# Patient Record
Sex: FEMALE | Race: WHITE | NOT HISPANIC OR LATINO | Employment: STUDENT
[De-identification: names, ages, dates, MRNs, and addresses within clinical notes are randomized per-mention and may not be internally consistent; named-entity substitution may affect disease eponyms.]

---
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
## 2021-07-15 ENCOUNTER — OFFICE VISIT (OUTPATIENT)
Dept: FAMILY MEDICINE | Facility: CLINIC | Age: 21
End: 2021-07-15
Payer: COMMERCIAL

## 2021-07-15 VITALS
SYSTOLIC BLOOD PRESSURE: 114 MMHG | WEIGHT: 101.4 LBS | HEIGHT: 63 IN | BODY MASS INDEX: 17.96 KG/M2 | OXYGEN SATURATION: 99 % | RESPIRATION RATE: 14 BRPM | DIASTOLIC BLOOD PRESSURE: 70 MMHG | TEMPERATURE: 96.5 F | HEART RATE: 58 BPM

## 2021-07-15 DIAGNOSIS — F39 MOOD DISORDER (H): ICD-10-CM

## 2021-07-15 DIAGNOSIS — K59.00 CONSTIPATION, UNSPECIFIED CONSTIPATION TYPE: ICD-10-CM

## 2021-07-15 DIAGNOSIS — Z11.3 ROUTINE SCREENING FOR STI (SEXUALLY TRANSMITTED INFECTION): ICD-10-CM

## 2021-07-15 DIAGNOSIS — Z00.00 ROUTINE GENERAL MEDICAL EXAMINATION AT A HEALTH CARE FACILITY: Primary | ICD-10-CM

## 2021-07-15 DIAGNOSIS — R19.8 IRREGULAR BOWEL HABITS: ICD-10-CM

## 2021-07-15 PROCEDURE — 87591 N.GONORRHOEAE DNA AMP PROB: CPT | Performed by: FAMILY MEDICINE

## 2021-07-15 PROCEDURE — 86803 HEPATITIS C AB TEST: CPT | Performed by: FAMILY MEDICINE

## 2021-07-15 PROCEDURE — 36415 COLL VENOUS BLD VENIPUNCTURE: CPT | Performed by: FAMILY MEDICINE

## 2021-07-15 PROCEDURE — 87389 HIV-1 AG W/HIV-1&-2 AB AG IA: CPT | Performed by: FAMILY MEDICINE

## 2021-07-15 PROCEDURE — 99213 OFFICE O/P EST LOW 20 MIN: CPT | Mod: 25 | Performed by: FAMILY MEDICINE

## 2021-07-15 PROCEDURE — 87491 CHLMYD TRACH DNA AMP PROBE: CPT | Performed by: FAMILY MEDICINE

## 2021-07-15 PROCEDURE — 99385 PREV VISIT NEW AGE 18-39: CPT | Performed by: FAMILY MEDICINE

## 2021-07-15 ASSESSMENT — ANXIETY QUESTIONNAIRES
IF YOU CHECKED OFF ANY PROBLEMS ON THIS QUESTIONNAIRE, HOW DIFFICULT HAVE THESE PROBLEMS MADE IT FOR YOU TO DO YOUR WORK, TAKE CARE OF THINGS AT HOME, OR GET ALONG WITH OTHER PEOPLE: VERY DIFFICULT
7. FEELING AFRAID AS IF SOMETHING AWFUL MIGHT HAPPEN: NOT AT ALL
3. WORRYING TOO MUCH ABOUT DIFFERENT THINGS: NEARLY EVERY DAY
6. BECOMING EASILY ANNOYED OR IRRITABLE: SEVERAL DAYS
2. NOT BEING ABLE TO STOP OR CONTROL WORRYING: SEVERAL DAYS
GAD7 TOTAL SCORE: 8
1. FEELING NERVOUS, ANXIOUS, OR ON EDGE: NOT AT ALL
5. BEING SO RESTLESS THAT IT IS HARD TO SIT STILL: NOT AT ALL

## 2021-07-15 ASSESSMENT — PATIENT HEALTH QUESTIONNAIRE - PHQ9
SUM OF ALL RESPONSES TO PHQ QUESTIONS 1-9: 14
5. POOR APPETITE OR OVEREATING: NEARLY EVERY DAY

## 2021-07-15 ASSESSMENT — MIFFLIN-ST. JEOR: SCORE: 1196.14
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
## 2021-07-15 NOTE — PATIENT INSTRUCTIONS
You will be contacted in 1-2 days for results of your lab tests.    Start metamucil as prescribed.  Eat food rich in fiber but avoid food that triggers symptoms.  Schedule virtual visit in 2-3 weeks for follow up on the bowel habits.    If you need to address the mood disorder more, schedule future virtual visit as well.    Preventative Care Visits include: Yearly physicals, Well-child visits, Welcome to Medicare visits, & Medicare yearly wellness exams.    The purpose of these visits is to discuss your medical history and prevent health problems before you are sick.  You may need to pay a copay, coinsurance or deductible if your visit today includes testing or treating for a new or existing condition.    Additional charges may be incurred for today's visit. If you have questions about what your insurance plan covers, please contact your Insurance Company's member service department.  If you have questions specific to a bill you have already received from [employer], please contact the [email or web address] Billing Office at [phone].     Preventive Health Recommendations  Female Ages 18 to 20     Yearly exam:     See your health care provider every year in order to  o Review health changes.   o Discuss preventive care.    o Review your medicines if your doctor has prescribed any.      You should be tested each year for STDs (sexually transmitted diseases).       After age 20, talk to your provider about how often you should have cholesterol testing.      If you are at risk for diabetes, you should have a diabetes test (fasting glucose).     Shots:     Get a flu shot each year.     Get a tetanus shot every 10 years.     Consider getting the shot (vaccine) that prevents cervical cancer (Gardasil).    Nutrition:     Eat at least 5 servings of fruits and vegetables each day.    Eat whole-grain bread, whole-wheat pasta and brown rice instead of white grains and rice.    Get adequate Calcium and Vitamin D.      Lifestyle    Exercise at least 150 minutes a week each week (30 minutes a day, 5 days a week). This will help you control your weight and prevent disease.    No smoking.     Wear sunscreen to prevent skin cancer.    See your dentist every six months for an exam and cleaning.

## 2021-07-16 LAB
C TRACH DNA SPEC QL PROBE+SIG AMP: NEGATIVE
HCV AB SERPL QL IA: NONREACTIVE
HIV 1+2 AB+HIV1 P24 AG SERPL QL IA: NONREACTIVE
N GONORRHOEA DNA SPEC QL NAA+PROBE: NEGATIVE

## 2021-07-16 ASSESSMENT — ANXIETY QUESTIONNAIRES: GAD7 TOTAL SCORE: 8

## 2021-09-19 ENCOUNTER — HEALTH MAINTENANCE LETTER (OUTPATIENT)
Age: 21
End: 2021-09-19

## 2021-10-13 ENCOUNTER — TELEPHONE (OUTPATIENT)
Dept: FAMILY MEDICINE | Facility: CLINIC | Age: 21
End: 2021-10-13

## 2021-10-14 ENCOUNTER — OFFICE VISIT (OUTPATIENT)
Dept: FAMILY MEDICINE | Facility: CLINIC | Age: 21
End: 2021-10-14
Payer: COMMERCIAL

## 2021-10-14 VITALS
HEART RATE: 61 BPM | OXYGEN SATURATION: 98 % | SYSTOLIC BLOOD PRESSURE: 100 MMHG | WEIGHT: 103 LBS | BODY MASS INDEX: 18.25 KG/M2 | DIASTOLIC BLOOD PRESSURE: 62 MMHG | HEIGHT: 63 IN | TEMPERATURE: 98.6 F | RESPIRATION RATE: 16 BRPM

## 2021-10-14 DIAGNOSIS — R05.9 COUGH: ICD-10-CM

## 2021-10-14 DIAGNOSIS — J02.0 STREP THROAT: Primary | ICD-10-CM

## 2021-10-14 PROCEDURE — U0003 INFECTIOUS AGENT DETECTION BY NUCLEIC ACID (DNA OR RNA); SEVERE ACUTE RESPIRATORY SYNDROME CORONAVIRUS 2 (SARS-COV-2) (CORONAVIRUS DISEASE [COVID-19]), AMPLIFIED PROBE TECHNIQUE, MAKING USE OF HIGH THROUGHPUT TECHNOLOGIES AS DESCRIBED BY CMS-2020-01-R: HCPCS | Performed by: FAMILY MEDICINE

## 2021-10-14 PROCEDURE — U0005 INFEC AGEN DETEC AMPLI PROBE: HCPCS | Performed by: FAMILY MEDICINE

## 2021-10-14 PROCEDURE — 99213 OFFICE O/P EST LOW 20 MIN: CPT | Performed by: FAMILY MEDICINE

## 2021-10-14 RX ORDER — BENZONATATE 100 MG/1
100 CAPSULE ORAL 3 TIMES DAILY PRN
Qty: 20 CAPSULE | Refills: 0 | Status: SHIPPED | OUTPATIENT
Start: 2021-10-14 | End: 2021-12-24

## 2021-10-14 RX ORDER — AZITHROMYCIN 250 MG/1
TABLET, FILM COATED ORAL
Qty: 6 TABLET | Refills: 0 | Status: SHIPPED | OUTPATIENT
Start: 2021-10-14 | End: 2021-10-19

## 2021-10-14 RX ORDER — ECHINACEA PURPUREA EXTRACT 125 MG
2 TABLET ORAL 3 TIMES DAILY PRN
Qty: 15 ML | Refills: 1 | Status: SHIPPED | OUTPATIENT
Start: 2021-10-14 | End: 2021-12-24

## 2021-10-14 ASSESSMENT — MIFFLIN-ST. JEOR: SCORE: 1198.71

## 2021-10-14 NOTE — PROGRESS NOTES
Assessment & Plan     Strep throat  Cough  Treat presumptively for for strep but with a different antibiotic given she had penicillin twice within the last month. I will give her azithromycin which also could have coverage for any pulmonary etiology like walking pneumonia but her lung exam was normal.  We will also test for Covid just to make sure she is cleared to be able to go back to school.  Discuss precautions of handwashing and not sharing dishes with her roommates to prevent spreading strep.  Gave her prescription for nasal saline as well as recommended using steam with caution to help clear the  nasal congestion  - azithromycin (ZITHROMAX) 250 MG tablet  Dispense: 6 tablet; Refill: 0  - benzonatate (TESSALON) 100 MG capsule  Dispense: 20 capsule; Refill: 0  - Symptomatic COVID-19 Virus (Coronavirus) by PCR Nose    Call or return to clinic if not improving or symptoms worsening, fever or unable to adequate oral intake.       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 She is also had a runny nose and sore throat off and on for the last few weeks.  She reports going to a [facility] clinic in late September and tested positive for strep and negative for Covid. She was treated with penicillin. She felt better for about a week and then the symptoms came back. She was seen at a different [facility] clinic 10 days ago tested positive again for strep and negative for Covid. She was given penicillin again. She did not feel that the second round of antibiotics was helpful.  She reports a dry cough is worse at night.  She is also  "having a mind fog and fatigue. She has had some earaches that have improved slightly.   sick contact -roommates have similar symptoms with URI type symptoms but have not been seen by a doctor. She is also had a lot of people at her school who have been sick.  Tried-water and cough drops    The records and treatment from the [facility] clinic are not in [facility] so she signed an RACHELLE for both locations    Social history  She is a college student at the [employer] studying [employer]    Review of Systems     Please see above.  The rest of the review of systems are negative for all systems.    Current Outpatient Medications   Medication Instructions     azithromycin (ZITHROMAX) 250 MG tablet Take 2 tablets (500 mg) by mouth daily for 1 day, THEN 1 tablet (250 mg) daily for 4 days.     benzonatate (TESSALON) 100 mg, Oral, 3 TIMES DAILY PRN     chlorophyll 100 mg, Oral, DAILY     sodium chloride (OCEAN) 0.65 % nasal spray 2 sprays, Nasal, 3 TIMES DAILY PRN         Objective   Vitals:    10/14/21 1630   BP: 100/62   Pulse: 61   Resp: 16   Temp: 98.6  F (37  C)   TempSrc: Oral   SpO2: 98%   Weight: 46.7 kg (103 lb)   Height: 1.588 m (5' 2.52\")       Body mass index is 18.53 kg/m .    Physical Exam    OBJECTIVE:  Vitals listed above within normal limits  General appearance: well groomed, pleasant, well hydrated, nontoxic appearing  ENT: PERRL, throat erythematous with mild cobblestoning of posterior pharnyx, TMs normal   Neck: neck supple, no lymphadenopathy, no thyromegaly  Lungs: lungs clear to auscultation bilaterally, no wheezes or rhonchi  Heart: regular rate and rhythm, no murmurs, rubs or gallops  Abdomen: soft, nontender  Neuro: no focal deficits, CN II-XII grossly intact, alert and oriented  Psych:  mood stable, appears to have good insight and judgment    No results found for this or any previous visit (from the past 1008 hour(s)).           "

## 2021-10-15 LAB — SARS-COV-2 RNA RESP QL NAA+PROBE: NEGATIVE

## 2021-12-23 NOTE — PROGRESS NOTES
Tahmina is a 21 year old who is being evaluated via a billable video visit.      How would you like to obtain your AVS? MyChart  If the video visit is dropped, the invitation should be resent by: Text to cell phone: [phone]  Will anyone else be joining your video visit? No    Video Start Time: 8:00 AM    Assessment & Plan     Organic affective disorder  Patient has occasional racing thoughts. Racing speech. Overeating.  Patient also has features of OCD with intrusive thoughts and previous history of compulsions.    JAMA (generalized anxiety disorder)  Issues of generalized anxiety have also been associated with depression and diminished motivation.  These episodes are interspersed numbness.  Some hyperactivity, and racing thoughts.    Patient has significant intrusive thoughts of negativity, rarely suicidality, and sexuality discord thoughts.  It is at the level of intrusive that she is most hindered.       Depression Screening Follow Up    PHQ 12/24/2021   PHQ-9 Total Score 15   Q9: Thoughts of better off dead/self-harm past 2 weeks Not at all         Follow Up Actions Taken       See Patient Instructions    No follow-ups on file.    Mina Marks MD  [facility]    Rebeka Martel is a 21 year old who presents for the following health issues     HPI 21-year-old female who has had significant issues from a psychological standpoint for years duration.  She has a history dating back several years regarding OCD features of having to count  Prior to activity completion.    She states that when these compulsions disappeared, she started to have intrusive thoughts.  Her intrusive thoughts are of negativity about self, as well as about her sexuality.  Patient is homosexual.  She states her intrusive thoughts of sexually telling her to be heterosexual.    She has difficulty in school at the level of focus.  Occasionally she has very low motivation to do things.  She procrastinates.  She also has  "issues of anxiety occasionally riddled with panic.  She has been overeating of late.  She states that eating opacifies her symptoms to some degree.    She has a family history of generalized anxiety disorder.  There is no family history of bipolar affective disorder that she is aware of.  She denies grandiosity.  She denies overspending but does have strong urges to spend particularly when she feels hyper energetic.  She states that her family members noticed her speech pattern when she is \"hyper\".    She has been seeing a psychologist for roughly 1 year.  She has not started on any medications.  She would like to have screening for mental illness and eventual treatment.  Patient states that she would like to work through her issues naturally if she can.    Anxiety      Review of Systems   Constitutional, HEENT, cardiovascular, pulmonary, gi and gu systems are negative, except as otherwise noted.      Objective           Vitals:  No vitals were obtained today due to virtual visit.    Physical Exam   GENERAL: Healthy, alert and no distress  EYES: Eyes grossly normal to inspection.  No discharge or erythema, or obvious scleral/conjunctival abnormalities.  RESP: No audible wheeze, cough, or visible cyanosis.  No visible retractions or increased work of breathing.    SKIN: Visible skin clear. No significant rash, abnormal pigmentation or lesions.  NEURO: Cranial nerves grossly intact.  Mentation and speech appropriate for age.  PSYCH: Mentation appears normal, affect normal/bright, judgement and insight intact, normal speech and appearance well-groomed.    Office Visit on 10/14/2021   Component Date Value Ref Range Status     SARS CoV2 PCR 10/14/2021 Negative  Negative Final    NEGATIVE: SARS-CoV-2 (COVID-19) RNA not detected, presumed negative.               Video-Visit Details    Type of service:  Video Visit    Video End Time:8:53 AM    Originating Location (pt. Location): Home    Distant Location (provider " location):  [facility]     Platform used for Video Visit: Unable to complete video visit

## 2021-12-24 ENCOUNTER — VIRTUAL VISIT (OUTPATIENT)
Dept: FAMILY MEDICINE | Facility: CLINIC | Age: 21
End: 2021-12-24
Payer: COMMERCIAL

## 2021-12-24 ENCOUNTER — E-CONSULT (OUTPATIENT)
Dept: BEHAVIORAL HEALTH | Facility: CLINIC | Age: 21
End: 2021-12-24

## 2021-12-24 DIAGNOSIS — F41.1 GAD (GENERALIZED ANXIETY DISORDER): ICD-10-CM

## 2021-12-24 DIAGNOSIS — F06.30 ORGANIC AFFECTIVE DISORDER: Primary | ICD-10-CM

## 2021-12-24 PROCEDURE — 99451 NTRPROF PH1/NTRNET/EHR 5/>: CPT | Performed by: PSYCHIATRY & NEUROLOGY

## 2021-12-24 PROCEDURE — 99213 OFFICE O/P EST LOW 20 MIN: CPT | Mod: 95 | Performed by: INTERNAL MEDICINE

## 2021-12-24 ASSESSMENT — ANXIETY QUESTIONNAIRES
2. NOT BEING ABLE TO STOP OR CONTROL WORRYING: NEARLY EVERY DAY
7. FEELING AFRAID AS IF SOMETHING AWFUL MIGHT HAPPEN: SEVERAL DAYS
3. WORRYING TOO MUCH ABOUT DIFFERENT THINGS: NEARLY EVERY DAY
6. BECOMING EASILY ANNOYED OR IRRITABLE: SEVERAL DAYS
5. BEING SO RESTLESS THAT IT IS HARD TO SIT STILL: MORE THAN HALF THE DAYS
IF YOU CHECKED OFF ANY PROBLEMS ON THIS QUESTIONNAIRE, HOW DIFFICULT HAVE THESE PROBLEMS MADE IT FOR YOU TO DO YOUR WORK, TAKE CARE OF THINGS AT HOME, OR GET ALONG WITH OTHER PEOPLE: SOMEWHAT DIFFICULT
GAD7 TOTAL SCORE: 14
1. FEELING NERVOUS, ANXIOUS, OR ON EDGE: SEVERAL DAYS

## 2021-12-24 ASSESSMENT — PATIENT HEALTH QUESTIONNAIRE - PHQ9
5. POOR APPETITE OR OVEREATING: NEARLY EVERY DAY
SUM OF ALL RESPONSES TO PHQ QUESTIONS 1-9: 15

## 2021-12-24 NOTE — PROGRESS NOTES
ALL SMARTFIELDS MUST BE COMPLETED FOR PATIENT CARE AND BILLING    12/24/2021     E-Consult has been accepted.    Interprofessional consultation requested by:  Mina Marks MD      Clinical Question/Purpose: MY CLINICAL QUESTION IS: Patient with affective symptoms, OCD symptomatology, occasional intrusive thoughts negativity, sexuality, suicidality    Patient assessment and information reviewed: chart review    Recommendations:  I am glad she is going to therapy, but there are a few specific things that may be of help for her.  At a minimum, she should be working on CBT skills to help with intrusive thoughts.  As far as some of her other symptoms, these may be helped with some DBT skills.  It may be worth sending a note or having the patient talk to her therapist about these two specific kinds of therapy to make sure that some of these skills are learned.     As far as medications go, starting with an selective serotonin reuptake inhibitor would be in order.  This is both good for anxiety and OCD like symptoms.  Prozac is the most widely used for OCD like symptoms.  Starting at 10 mg for a week and then going to 20 mg would be a good way to start.   To treat the OCD like symptoms, most people need to go to a high dose of prozac (80 mg).  Prozac can be increased every 2-4 weeks, if she is tolerating it.  Symptoms may improve at a lower dose, which would be great.  There is no reason to go to a higher dose unless breakthrough symptoms come on or only a partial improvement.  It is best to go to the highest dose before looking to add medications.  Prozac can cause worsening anxiety (but also can be an excellent medication for anxiety).  Usually instead of worsening anxiety, a person might feel jittery, not be able to sleep as well or feel like they have too much energy (like drinking too much caffeine).  If this happens, prozac should be stopped.    Another option would be lexapro. Lexapro works as well as prozac but  generally does not have the increased energy or jitteriness that may occur with prozac.  One can start with 5 mg for a week and then go to 10 mg.  For OCD like symptoms, usually you have to go to the highest dose of 20 mg.  Similar to prozac, if symptoms are improved, there is no reason to go up.      Hydroxyzine is a great way to help with the off and on anxiety and sometimes feeling like one is hyper.  Using 25-50 mg tid prn for anxiety can be helpful.  The main side effect is sleepiness, which can be used as a bonus if helping sleep is needed.  Some need to take 12.5 mg to avoid sleepiness, which is okay.      Thank you for the consult.      The recommendations provided in this E-Consult are based on the clinical data available to me in the medical record, and are furnished without the benefit of a comprehensive in-person or virtual patient evaluation.  This consultation should not replace the clinical judgement and evaluation of the provider ordering this E-Consult. Any new clinical issues, or changes in patient status since the filing of this E-Consult will need to be taken into account when assessing these recommendations. Please contact me if you have further questions.    My total time spent reviewing clinical information and formulating assessment was 15 minutes.    Report sent automatically to requesting provider once signed.     Sami García MD

## 2021-12-25 ASSESSMENT — ANXIETY QUESTIONNAIRES: GAD7 TOTAL SCORE: 14

## 2022-04-08 ENCOUNTER — OFFICE VISIT (OUTPATIENT)
Dept: FAMILY MEDICINE | Facility: CLINIC | Age: 22
End: 2022-04-08
Payer: COMMERCIAL

## 2022-04-08 ENCOUNTER — LAB (OUTPATIENT)
Dept: LAB | Facility: CLINIC | Age: 22
End: 2022-04-08
Payer: COMMERCIAL

## 2022-04-08 ENCOUNTER — TELEPHONE (OUTPATIENT)
Dept: FAMILY MEDICINE | Facility: CLINIC | Age: 22
End: 2022-04-08

## 2022-04-08 VITALS
OXYGEN SATURATION: 98 % | DIASTOLIC BLOOD PRESSURE: 88 MMHG | TEMPERATURE: 97.5 F | HEART RATE: 66 BPM | SYSTOLIC BLOOD PRESSURE: 131 MMHG | WEIGHT: 105.4 LBS | HEIGHT: 63 IN | BODY MASS INDEX: 18.68 KG/M2

## 2022-04-08 DIAGNOSIS — Z72.51 UNPROTECTED SEXUAL INTERCOURSE: ICD-10-CM

## 2022-04-08 DIAGNOSIS — Z72.51 UNPROTECTED SEXUAL INTERCOURSE: Primary | ICD-10-CM

## 2022-04-08 LAB
HSV1 IGG SERPL QL IA: <0.01 INDEX
HSV1 IGG SERPL QL IA: NORMAL
HSV2 IGG SERPL QL IA: 0.06 INDEX
HSV2 IGG SERPL QL IA: NORMAL
T PALLIDUM AB SER QL: NONREACTIVE

## 2022-04-08 PROCEDURE — 86780 TREPONEMA PALLIDUM: CPT | Mod: 90 | Performed by: PATHOLOGY

## 2022-04-08 PROCEDURE — 99000 SPECIMEN HANDLING OFFICE-LAB: CPT | Performed by: PATHOLOGY

## 2022-04-08 PROCEDURE — 86695 HERPES SIMPLEX TYPE 1 TEST: CPT | Mod: 90 | Performed by: PATHOLOGY

## 2022-04-08 PROCEDURE — 99203 OFFICE O/P NEW LOW 30 MIN: CPT | Performed by: NURSE PRACTITIONER

## 2022-04-08 PROCEDURE — 36415 COLL VENOUS BLD VENIPUNCTURE: CPT | Performed by: PATHOLOGY

## 2022-04-08 PROCEDURE — 86696 HERPES SIMPLEX TYPE 2 TEST: CPT | Mod: 90 | Performed by: PATHOLOGY

## 2022-04-08 ASSESSMENT — ENCOUNTER SYMPTOMS
WOUND: 0
FATIGUE: 0
DIARRHEA: 0
HEMATURIA: 0
DYSURIA: 0
ADENOPATHY: 0
NAUSEA: 0
SHORTNESS OF BREATH: 0
COUGH: 0
ABDOMINAL PAIN: 0
CHILLS: 0
VOMITING: 0
BRUISES/BLEEDS EASILY: 0
FEVER: 0
DIFFICULTY URINATING: 0

## 2022-04-08 NOTE — TELEPHONE ENCOUNTER
Lab called said that they have an incorrect lab swab for the patient Tahmina and other concerns about the lab and would like to talk to you about the labs.   Here is the phone number, [phone].

## 2022-04-08 NOTE — PATIENT INSTRUCTIONS
Patient Education     Yeast Infection (Candida Vaginal Infection)    You have a Candida vaginal infection. This is also known as a yeast infection. It's most often caused by a type of yeast (fungus) called Candida. Candida are normally found in the vagina. But if they increase in number, this can lead to infection and cause symptoms.   Symptoms of a yeast infection can include:     Clumpy or thin, white discharge, which may look like cottage cheese    Itching or burning    Burning with urination  Certain factors can make a yeast infection more likely. These can include:     Taking certain medicines, such as antibiotics or birth control pills    Pregnancy    Diabetes    Weak immune system  A yeast infection is most often treated with antifungal medicine. This may be given as a vaginal cream or pills you take by mouth. Treatment may last for about 1 to 7 days. Women with severe or recurrent infections may need longer courses of treatment.   Home care    If you re prescribed medicine, be sure to use it as directed. Finish all of the medicine, even if your symptoms go away. Don t try to treat yourself using over-the-counter products without talking with your provider first. They will let you know if this is a good option for you.    Ask your provider what steps you can take to help reduce your risk of having a yeast infection in the future.    Follow-up care  Follow up with your healthcare provider, or as directed.   When to seek medical advice  Call your healthcare provider right away if:     You have a fever of 100.4 F (38 C) or higher, or as directed by your provider.    Your symptoms worsen, or they don t go away within a few days of starting treatment.    You have new pain in the lower belly or pelvic region.    You have side effects that bother you or a reaction to the cream or pills you re prescribed.    You or any partners you have sex with have new symptoms, such as a rash, joint pain, or sores.  [employer] last  reviewed this educational content on 7/1/2020 2000-2021 [employer]. All rights reserved. This information is not intended as a substitute for professional medical care. Always follow your healthcare professional's instructions.             If You Think You Have an STI (STD)  Treating a sexually transmitted infection (STI) early limits the problems it can cause and helps prevent its spread to others. An STI is also known as a sexually transmitted disease (STD). If you have an STI, get treated right away. Ask your partner to be tested, too. Then avoid sex until you ve finished treatment and your healthcare provider says it s OK to have sex again.   Follow your treatment plan  Treatment depends on the type of STI you have. Common treatments include injections and oral pills or liquids. Creams and gels can be applied to sores or warts caused by certain STIs. Follow the tips below:     Get new treatment for each new STI.    Don t use old medicine, even for the same STI. Use medicines as directed.    Don t share medicine unless instructed to do so by your healthcare provider or clinic.  Talk to your partner  If you have an STI, it s your duty to tell all your recent partners so they can be tested and treated. This is one important way to prevent the disease from being spread. Telling a partner that you have an STI can be hard. You may be embarrassed, angry, or afraid. It s often unclear who had the STI first. So try not to place blame. Your healthcare provider may offer some advice on how to start.     Prevent future problems  Even after you ve been treated, you can still be infected again. This is a common problem. It can happen if a partner passes the STI back to you. To prevent this, your partner or partners must be tested. He or she may also need treatment. After treatment, go to any scheduled follow-up visits. Then prevent future problems with safer sex. Limit your number of partners and always use  a latex condom.   Diagnosing STIs  Your healthcare provider will take a health history and examine you. During your health history, you will be asked about your sex habits and health history. You may also be asked about drug use. Give honest answers. Your healthcare provider will then check your body for signs of STIs. He or she also may do one or more of the following tests:     Fluid may be swabbed from sores. Samples also may be taken from the vagina, penis, mouth, or rectum. The samples are then tested for STIs.    Blood or urine samples may be taken. They are checked for viruses or bacteria that cause STIs.    For women, cells from the cervix are checked for signs of cancer and the genital wart virus (human papillomavirus, or HPV). This is called a Pap smear, and is often now done along with HPV testing. If cell changes are found, or a high-risk type of HPV is found, a magnifying scope may be used to take a closer look (colposcopy). In men and women, a Pap smear may be done on the anus to check for HPV-linked cancer or precancerous changes. This is done by a healthcare provider gently swabbing cells from the lining of the anus, and then sending the sample to be looked at in the lab. If there are signs of abnormality, you may need more testing.  [employer] last reviewed this educational content on 2/1/2019 2000-2021 [employer]. All rights reserved. This information is not intended as a substitute for professional medical care. Always follow your healthcare professional's instructions.

## 2022-04-08 NOTE — PROGRESS NOTES
Today's Date: Apr 8, 2022     Patient Tahmina Muhammad 2000 presents to the clinic today to address   Chief Complaint   Patient presents with     Establish Care     sti testing symptomatic time 3-4 days             SUBJECTIVE     History of Present Illness: 21 year old female patient presents for STI testing. Patient reports an increase in vaginal discharge a few days ago, which coincides with sexual encounter with new partner. Patient denies fevers, lower pelvic pain. She endorses vaginal itching and  cottage cheese consistency of vaginal discharge. Denies foul smelling vaginal discharge odor and UTI symptoms. Reports recent start to menstrual cycle. No other acute concerns/complaints at time of exam.    Review of Systems   Constitutional: Negative for chills, fatigue and fever.   Respiratory: Negative for cough and shortness of breath.    Cardiovascular: Negative for chest pain.   Gastrointestinal: Negative for abdominal pain, diarrhea, nausea and vomiting.   Genitourinary: Positive for vaginal discharge. Negative for difficulty urinating, dyspareunia, dysuria, genital sores, hematuria and pelvic pain.   Skin: Negative for rash and wound.   Hematological: Negative for adenopathy. Does not bruise/bleed easily.         No Known Allergies     No current outpatient medications    History reviewed. No pertinent past medical history.     Family History   Problem Relation Age of Onset     Anemia Mother         Social History     Tobacco Use     Smoking status: Never Smoker     Smokeless tobacco: Never Used   Vaping Use     Vaping Use: Never used   Substance Use Topics     Alcohol use: Yes     Alcohol/week: 12.0 standard drinks     Types: 12 Standard drinks or equivalent per week     Drug use: Yes     Types: Marijuana        History   Sexual Activity     Sexual activity: Yes     Partners: Female        PHQ 7/15/2021 12/24/2021   PHQ-9 Total Score 14 15   Q9: Thoughts of better off dead/self-harm past 2 weeks Not at all  "Not at all        Immunization History   Administered Date(s) Administered     COVID-19,PF,Moderna 04/10/2021, 05/08/2021     DTaP, Unspecified 2001, 01/29/2002, 03/25/2002, 12/27/2002, 09/22/2006     HepB, Unspecified 2001, 01/29/2002, 12/27/2002     Hib, Unspecified 2001, 01/29/2002, 12/27/2002     MMR 09/24/2002, 09/22/2006     Pneumococcal (PCV 7) 2001, 01/29/2002, 03/25/2002, 08/01/2003     Poliovirus, inactivated (IPV) 2001, 01/29/2002, 03/25/2002, 09/21/2005     Varicella 12/27/2002, 09/22/2006                 OBJECTIVE     /88 (BP Location: Right arm, Patient Position: Sitting, Cuff Size: Adult Regular)   Pulse 66   Temp 97.5  F (36.4  C) (Oral)   Ht 1.6 m (5' 3\")   Wt 47.8 kg (105 lb 6.4 oz)   LMP  (Within Days)   SpO2 98%   Breastfeeding No   BMI 18.67 kg/m       Labs:  No results found for: WBC, HGB, HCT, PLT, CHOL, TRIG, HDL, LDLDIRECT, ALT, AST, NA, CREATININE, BUN, CO2, TSH, PSA, INR, GLUF, HGBA1C, MICROALBUR     Physical Exam  Constitutional:       General: She is not in acute distress.     Appearance: She is not ill-appearing or toxic-appearing.   Cardiovascular:      Rate and Rhythm: Normal rate and regular rhythm.      Heart sounds: Normal heart sounds. No murmur heard.    No friction rub. No gallop.   Pulmonary:      Effort: Pulmonary effort is normal. No respiratory distress.      Breath sounds: Normal breath sounds. No stridor. No wheezing or rales.   Genitourinary:     Comments: Declined by patient.  Neurological:      Mental Status: She is alert.   Psychiatric:         Thought Content: Thought content normal.         Judgment: Judgment normal.               ASSESSMENT/PLAN     1. Unprotected sexual intercourse  Patient denies systemic symptoms. Current Symptoms and clinical course more likely than not candidal vaginitis. Pt declines wet prep/vaginal exam today as she prefers a female provider. Pt declined HIV testing.  Will check for STIs including " gonorrhea, clamydia, and trichomonas with patient self swab. Disposition pending lab results, if labs negative, advised patient that she can try OTC monistat and see if it helps her symptoms.    - Treponema Abs w Reflex to RPR and Titer; Future  - Herpes Simplex Virus 1 and 2 IgG; Future  - Trichomonas vaginalis by PCR; Future  - NEISSERIA GONORRHOEA PCR; Future  - CHLAMYDIA TRACHOMATIS PCR; Future    Follow-Up:  - Follow up in as needed, or if symptoms worsen or fail to improve.     Options for treatment and follow-up care were reviewed with the patient. Patient engaged in the decision making process and verbalized understanding of the options discussed and agreed with the final plan.  AVS printed and given to patient.    YOLIS Benson [facility]  [address] [place] [id number] [phone]

## 2022-04-11 ENCOUNTER — TELEPHONE (OUTPATIENT)
Dept: FAMILY MEDICINE | Facility: CLINIC | Age: 22
End: 2022-04-11
Payer: COMMERCIAL

## 2022-04-11 NOTE — TELEPHONE ENCOUNTER
Left message for patient to call regarding appointment on this past Friday,  this is the second attempt to contact thi patient. no other information was left on this VM.    Ayla Paz., CMA

## 2022-06-01 ENCOUNTER — OFFICE VISIT (OUTPATIENT)
Dept: FAMILY MEDICINE | Facility: CLINIC | Age: 22
End: 2022-06-01
Payer: COMMERCIAL

## 2022-06-01 VITALS
SYSTOLIC BLOOD PRESSURE: 110 MMHG | TEMPERATURE: 98.7 F | RESPIRATION RATE: 14 BRPM | WEIGHT: 103 LBS | BODY MASS INDEX: 18.25 KG/M2 | DIASTOLIC BLOOD PRESSURE: 60 MMHG | HEIGHT: 63 IN | OXYGEN SATURATION: 98 % | HEART RATE: 64 BPM

## 2022-06-01 DIAGNOSIS — H66.005 RECURRENT ACUTE SUPPURATIVE OTITIS MEDIA WITHOUT SPONTANEOUS RUPTURE OF LEFT TYMPANIC MEMBRANE: Primary | ICD-10-CM

## 2022-06-01 PROCEDURE — 99213 OFFICE O/P EST LOW 20 MIN: CPT | Performed by: NURSE PRACTITIONER

## 2022-06-01 RX ORDER — CEFDINIR 300 MG/1
300 CAPSULE ORAL 2 TIMES DAILY
Qty: 20 CAPSULE | Refills: 0 | Status: SHIPPED | OUTPATIENT
Start: 2022-06-01 | End: 2022-06-11

## 2022-06-01 RX ORDER — FLUTICASONE PROPIONATE 50 MCG
1 SPRAY, SUSPENSION (ML) NASAL DAILY
Qty: 16 G | Refills: 0 | Status: SHIPPED | OUTPATIENT
Start: 2022-06-01 | End: 2022-07-25

## 2022-06-01 RX ORDER — BENZONATATE 100 MG/1
100 CAPSULE ORAL 3 TIMES DAILY PRN
COMMUNITY
End: 2022-10-17

## 2022-06-01 RX ORDER — AMOXICILLIN 500 MG/1
500 TABLET, FILM COATED ORAL 2 TIMES DAILY
COMMUNITY
End: 2022-06-01

## 2022-06-01 ASSESSMENT — ENCOUNTER SYMPTOMS
COUGH: 1
RHINORRHEA: 1
GASTROINTESTINAL NEGATIVE: 1
HEADACHES: 1
CHILLS: 1
FEVER: 0
FATIGUE: 1
SINUS PRESSURE: 1

## 2022-06-01 ASSESSMENT — PATIENT HEALTH QUESTIONNAIRE - PHQ9
10. IF YOU CHECKED OFF ANY PROBLEMS, HOW DIFFICULT HAVE THESE PROBLEMS MADE IT FOR YOU TO DO YOUR WORK, TAKE CARE OF THINGS AT HOME, OR GET ALONG WITH OTHER PEOPLE: VERY DIFFICULT
SUM OF ALL RESPONSES TO PHQ QUESTIONS 1-9: 11
SUM OF ALL RESPONSES TO PHQ QUESTIONS 1-9: 11

## 2022-06-01 NOTE — PROGRESS NOTES
Assessment & Plan     Recurrent acute suppurative otitis media without spontaneous rupture of left tympanic membrane  Symptoms consistent with left OM. Instructed to stop amoxicillin and start taking Omnicef. Side effects, risks and benefits of medication were discussed with patient. Discussed how and when to take medication. Recommended taking a probiotic and/or eating yogurt every day while on antibiotics and for ~1 week after stopping antibiotics to prevent GI upset. Discussed OTC recommendations for symptom control. Rest, hydration, humidification. Flonase prescribed. Side effects, risks and benefits of medication were discussed with patient. Discussed how and when to take medication.     - cefdinir (OMNICEF) 300 MG capsule; Take 1 capsule (300 mg) by mouth 2 times daily for 10 days  - fluticasone (FLONASE) 50 MCG/ACT nasal spray; Spray 1 spray into both nostrils daily             Depression Screening Follow Up    PHQ 6/1/2022   PHQ-9 Total Score 11   Q9: Thoughts of better off dead/self-harm past 2 weeks Not at all         Follow Up Actions Taken       Patient Instructions   You have a left ear infection.  1. Take antibiotics as prescribed for the full course.  2. Take a probiotic and/or eat yogurt daily while on antibiotics and ~1 week after to prevent GI upset.  3. Continue to use OTC medications for symptom relief.   4. Use Flonase as prescribed.  5. Rest and stay hydrated.  6. Use a humidifier in the bedroom, warm compresses on the face, and steam inhalation.  7. If symptoms worsen or do not improve within 1 week, please follow-up with your PCP.        Return if symptoms worsen or fail to improve.    YOLIS Norwood CNP  [facility] JOSE RAMON Martel is a 21 year old who presents for the following health issues     History of Present Illness       Headaches:   Since the patient's last clinic visit, headaches are: worsened  The patient is getting headaches:  It's just illness  "related  She is able to do normal daily activities when she has a migraine.  The patient is taking the following rescue/relief medications:  No rescue/relief medications   Patient states \"I get some relief\" from the rescue/relief medications.   The patient is taking the following medications to prevent migraines:  No medications to prevent migraines  In the past 4 weeks, the patient has gone to an Urgent Care or Emergency Room 0 times times due to headaches.    She eats 2-3 servings of fruits and vegetables daily.She consumes 2 sweetened beverage(s) daily.She exercises with enough effort to increase her heart rate 30 to 60 minutes per day.  She exercises with enough effort to increase her heart rate 4 days per week.   She is taking medications regularly.    Today's PHQ-9         PHQ-9 Total Score: 11    PHQ-9 Q9 Thoughts of better off dead/self-harm past 2 weeks :   Not at all    How difficult have these problems made it for you to do your work, take care of things at home, or get along with other people: Very difficult     SEE BELOW:      Acute Illness  Acute illness concerns: sinus issues - 2 doses of amoxicillin remaining (7 days course)  Onset/Duration: x early May  Symptoms:  Fever: no  Chills/Sweats: YES  Headache (location?): YES  Sinus Pressure: YES  Conjunctivitis:  no  Ear Pain: YES: left  Rhinorrhea: YES  Congestion: YES  Sore Throat: no  Cough: YES-productive of clear sputum, productive of yellow sputum  Wheeze: YES  Decreased Appetite: no  Nausea: no  Vomiting: no  Diarrhea: no  Dysuria/Freq.: no  Dysuria or Hematuria: no  Fatigue/Achiness: YES  Sick/Strep Exposure: no  Therapies tried and outcome: amoxicillin, strep 5 times this year - given amox every time    Additional provider notes: Symptoms started early May with cough and congestion. She used OTC medications for that. She then developed a right ear infection 1 week ago and was treated with amoxicillin by the [facility]. States symptoms improved " "after a couple days, but then last night left ear symptoms started and states she couldn't sleep due to pain. Tylenol provides some relief.     Per [facility] records, it looks like she was treated with Amoxicillin on 05/4/22 and then Augmentin on 05/25/22.     Has had 4-5 episodes of strep within the last year and has been treated with amoxicillin each time. She wonders if she is slightly resistant to it now.     No Known Allergies    Current Outpatient Medications   Medication     benzonatate (TESSALON) 100 MG capsule     cefdinir (OMNICEF) 300 MG capsule     fluticasone (FLONASE) 50 MCG/ACT nasal spray     No current facility-administered medications for this visit.     No past medical history on file.      Review of Systems   Constitutional: Positive for chills and fatigue. Negative for fever.   HENT: Positive for congestion, ear pain (left), rhinorrhea and sinus pressure.    Respiratory: Positive for cough.    Gastrointestinal: Negative.    Neurological: Positive for headaches.            Objective    /60 (BP Location: Right arm, Patient Position: Sitting, Cuff Size: Adult Regular)   Pulse 64   Temp 98.7  F (37.1  C) (Tympanic)   Resp 14   Ht 1.6 m (5' 3\")   Wt 46.7 kg (103 lb)   LMP 05/22/2022 (Approximate)   SpO2 98%   Breastfeeding No   BMI 18.25 kg/m    Body mass index is 18.25 kg/m .  Physical Exam  Vitals reviewed.   Constitutional:       General: She is not in acute distress.     Appearance: Normal appearance. She is not ill-appearing or toxic-appearing.   HENT:      Head: Normocephalic and atraumatic.      Right Ear: Tympanic membrane, ear canal and external ear normal.      Left Ear: Ear canal and external ear normal. Tympanic membrane is erythematous and bulging.      Nose: Rhinorrhea present.      Mouth/Throat:      Pharynx: Posterior oropharyngeal erythema present.      Tonsils: 2+ on the right. 2+ on the left.   Cardiovascular:      Rate and Rhythm: Normal rate and regular rhythm.      " Pulses: Normal pulses.      Heart sounds: Normal heart sounds.   Pulmonary:      Effort: Pulmonary effort is normal.      Breath sounds: Normal breath sounds.   Musculoskeletal:      Cervical back: Normal range of motion and neck supple.   Skin:     General: Skin is warm and dry.   Neurological:      Mental Status: She is alert and oriented to person, place, and time.   Psychiatric:         Behavior: Behavior normal.

## 2022-06-01 NOTE — PATIENT INSTRUCTIONS
You have a left ear infection.  Take antibiotics as prescribed for the full course.  Take a probiotic and/or eat yogurt daily while on antibiotics and ~1 week after to prevent GI upset.  Continue to use OTC medications for symptom relief.   Use Flonase as prescribed.  Rest and stay hydrated.  Use a humidifier in the bedroom, warm compresses on the face, and steam inhalation.  If symptoms worsen or do not improve within 1 week, please follow-up with your PCP.

## 2022-06-17 ENCOUNTER — LAB (OUTPATIENT)
Dept: LAB | Facility: CLINIC | Age: 22
End: 2022-06-17
Payer: COMMERCIAL

## 2022-06-17 ENCOUNTER — OFFICE VISIT (OUTPATIENT)
Dept: ALLERGY | Facility: CLINIC | Age: 22
End: 2022-06-17
Payer: COMMERCIAL

## 2022-06-17 VITALS
DIASTOLIC BLOOD PRESSURE: 71 MMHG | OXYGEN SATURATION: 97 % | BODY MASS INDEX: 17.94 KG/M2 | SYSTOLIC BLOOD PRESSURE: 105 MMHG | HEART RATE: 74 BPM | WEIGHT: 101.3 LBS

## 2022-06-17 DIAGNOSIS — B99.9 RECURRENT INFECTIONS: ICD-10-CM

## 2022-06-17 DIAGNOSIS — T78.1XXA OTHER ADVERSE FOOD REACTIONS, NOT ELSEWHERE CLASSIFIED, INITIAL ENCOUNTER: ICD-10-CM

## 2022-06-17 DIAGNOSIS — R14.0 BLOATING: ICD-10-CM

## 2022-06-17 DIAGNOSIS — R53.82 CHRONIC FATIGUE: ICD-10-CM

## 2022-06-17 DIAGNOSIS — K59.00 CONSTIPATION, UNSPECIFIED CONSTIPATION TYPE: ICD-10-CM

## 2022-06-17 DIAGNOSIS — T78.1XXA OTHER ADVERSE FOOD REACTIONS, NOT ELSEWHERE CLASSIFIED, INITIAL ENCOUNTER: Primary | ICD-10-CM

## 2022-06-17 LAB
ALBUMIN SERPL-MCNC: 3.6 G/DL (ref 3.4–5)
ALP SERPL-CCNC: 82 U/L (ref 40–150)
ALT SERPL W P-5'-P-CCNC: 16 U/L (ref 0–50)
ANION GAP SERPL CALCULATED.3IONS-SCNC: 2 MMOL/L (ref 3–14)
AST SERPL W P-5'-P-CCNC: 20 U/L (ref 0–45)
BILIRUB SERPL-MCNC: 1.3 MG/DL (ref 0.2–1.3)
BUN SERPL-MCNC: 12 MG/DL (ref 7–30)
CALCIUM SERPL-MCNC: 9.2 MG/DL (ref 8.5–10.1)
CHLORIDE BLD-SCNC: 106 MMOL/L (ref 94–109)
CO2 SERPL-SCNC: 30 MMOL/L (ref 20–32)
CREAT SERPL-MCNC: 0.67 MG/DL (ref 0.52–1.04)
ERYTHROCYTE [SEDIMENTATION RATE] IN BLOOD BY WESTERGREN METHOD: 32 MM/HR (ref 0–20)
GFR SERPL CREATININE-BSD FRML MDRD: >90 ML/MIN/1.73M2
GLUCOSE BLD-MCNC: 87 MG/DL (ref 70–99)
PLAT MORPH BLD: NORMAL
POTASSIUM BLD-SCNC: 3.9 MMOL/L (ref 3.4–5.3)
PROT SERPL-MCNC: 8.1 G/DL (ref 6.8–8.8)
RBC MORPH BLD: NORMAL
SODIUM SERPL-SCNC: 138 MMOL/L (ref 133–144)

## 2022-06-17 PROCEDURE — 86231 EMA EACH IG CLASS: CPT

## 2022-06-17 PROCEDURE — 85652 RBC SED RATE AUTOMATED: CPT

## 2022-06-17 PROCEDURE — 85027 COMPLETE CBC AUTOMATED: CPT

## 2022-06-17 PROCEDURE — 80053 COMPREHEN METABOLIC PANEL: CPT

## 2022-06-17 PROCEDURE — 82784 ASSAY IGA/IGD/IGG/IGM EACH: CPT

## 2022-06-17 PROCEDURE — 86364 TISS TRNSGLTMNASE EA IG CLAS: CPT | Mod: 59

## 2022-06-17 PROCEDURE — 36415 COLL VENOUS BLD VENIPUNCTURE: CPT

## 2022-06-17 PROCEDURE — 99204 OFFICE O/P NEW MOD 45 MIN: CPT | Performed by: INTERNAL MEDICINE

## 2022-06-17 PROCEDURE — 82040 ASSAY OF SERUM ALBUMIN: CPT

## 2022-06-17 RX ORDER — ONDANSETRON 4 MG/1
4 TABLET, ORALLY DISINTEGRATING ORAL
COMMUNITY
Start: 2022-06-05 | End: 2022-10-17

## 2022-06-17 ASSESSMENT — ENCOUNTER SYMPTOMS
FACIAL SWELLING: 0
ACTIVITY CHANGE: 0
MYALGIAS: 0
JOINT SWELLING: 0
NAUSEA: 0
HEADACHES: 0
VOMITING: 0
EYE ITCHING: 0
EYE REDNESS: 0
ADENOPATHY: 0
RHINORRHEA: 0
EYE DISCHARGE: 0
SINUS PRESSURE: 0
CHILLS: 0
DIARRHEA: 0
SHORTNESS OF BREATH: 0
WHEEZING: 0
ARTHRALGIAS: 0
COUGH: 0
FEVER: 0
CHEST TIGHTNESS: 0

## 2022-06-17 NOTE — PATIENT INSTRUCTIONS
Will check lab work due to the year history of recurrent infections.    Will also refer to gastroenterology due to the significant gastrointestinal symptoms are having of constipation, bloating, and abdominal pain.  Recurrent antibiotic use can also affect your GI system.    Elimination diet handout was provided.  This may help determine if you do have any food intolerances.    After thorough discussion, we will not plan to order any IgE or IgG based testing for foods.  We talked about how the symptoms that Tahmina has are not consistent with a food allergy and IgE based testing are not helpful.  IgG based testing is not available through the [facility] system.    Some of the symptoms of food intolerance and food allergy are similar, but the differences between the two are very important. Eating a food you are intolerant to can leave you feeling miserable. However, if you have a true food allergy, your body s reaction to this food could be life-threatening.??    Digestive system versus immune system?  A food intolerance response takes place in the digestive system. It occurs when you are unable to properly breakdown the food. This could be due to enzyme deficiencies, sensitivity to food additives or reactions to naturally occurring chemicals in foods. Often, people can eat small amounts of the food without causing problems.??A food allergic reaction involves the immune system. Your immune system controls how your body defends itself. For instance, if you have an allergy to cow s milk, your immune system identifies cow s milk as an invader or allergen. Your immune system overreacts by producing antibodies called Immunoglobulin E (IgE). These antibodies travel to cells that release chemicals, causing an allergic reaction. Each type of IgE has a specific  radar  for each type of allergen.??Unlike an intolerance to food, a food allergy can cause a serious or even life-threatening reaction by eating a microscopic  amount, touching or inhaling the food.??Symptoms of allergic reactions to foods are generally seen on the skin (hives, itchiness, swelling of the skin). Gastrointestinal symptoms may include vomiting and diarrhea. Respiratory symptoms may accompany skin and gastrointestinal symptoms, but don t usually occur alone.?? Anaphylaxis (pronounced of-r-gz-LAK-sis) is a serious allergic reaction that happens very quickly. Symptoms of anaphylaxis may include difficulty breathing, dizziness or loss of consciousness. Without immediate treatment--an injection of epinephrine (adrenalin) and expert care--anaphylaxis can be fatal.??

## 2022-06-17 NOTE — PROGRESS NOTES
Tahmina Muhammad was seen in the Allergy Clinic at [facility].    Tahmina Muhammad is a 21 year old White female being seen today  in consultation for concern for food allergy or food intolerance.    She has had a several year history of having gastrointestinal symptoms including bloating, abdominal pain, constipation as a primary concern with intermittent diarrhea as well as fatigue.  She feels most of her life she has had GI problems.  She is wondering if there is a food allergy or food sensitivity.  This will occur after almost all meals where she has problems with bloating or abdominal pain.  She spends close to 2 to 2-1/2 hours in the bathroom daily trying to make a bowel movement.  She feels like she has the inability to make a complete bowel movement.  She feels her stomach does not completely empty.  6 weeks ago while on antibiotic she did have episodes of diarrhea with some minimal blood.  She is what feels like there is a correlation with her GI health and mental health as well as a correlation with her menstrual cycles.    Her stools are typically hard.  No current blood at this time.  She does not have any reflux at this time but has had problems with reflux in the past.  She did try to avoid gluten at one point with some improvement.  She has not seen gastroenterology.    She has had 3-4 episodes of strep infections.  She did have an episode of pancreatitis that according to the notes may have been alcohol related.  She also had a recent ear infection.  She has had several rounds of antibiotics this year.    Her weight has been stable.      History reviewed. No pertinent past medical history.  Family History   Problem Relation Age of Onset     Anemia Mother      History reviewed. No pertinent surgical history.    ENVIRONMENTAL HISTORY:   Pets inside the house include 4 cat(s).  Do you smoke cigarettes or other recreational drugs? No There is/are 0 smokers living in the house. The house does  not have a damp basement.     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 2 of 3 of her roommates work as  and the last roommate has a babysitting job.    Review of Systems   Constitutional: Negative for activity change, chills and fever.   HENT: Negative for congestion, dental problem, ear pain, facial swelling, nosebleeds, postnasal drip, rhinorrhea, sinus pressure and sneezing.    Eyes: Negative for discharge, redness and itching.   Respiratory: Negative for cough, chest tightness, shortness of breath and wheezing.    Cardiovascular: Negative for chest pain.   Gastrointestinal: Negative for diarrhea, nausea and vomiting.   Musculoskeletal: Negative for arthralgias, joint swelling and myalgias.   Skin: Negative for rash.   Neurological: Negative for headaches.   Hematological: Negative for adenopathy.   Psychiatric/Behavioral: Negative for behavioral problems and self-injury.         Current Outpatient Medications:      benzonatate (TESSALON) 100 MG capsule, Take 100 mg by mouth 3 times daily as needed for cough (Patient not taking: Reported on 6/17/2022), Disp: , Rfl:      fluticasone (FLONASE) 50 MCG/ACT nasal spray, Spray 1 spray into both nostrils daily (Patient not taking: Reported on 6/17/2022), Disp: 16 g, Rfl: 0     ondansetron (ZOFRAN ODT) 4 MG ODT tab, Place 4 mg under the tongue (Patient not taking: Reported on 6/17/2022), Disp: , Rfl:   No Known Allergies      EXAM:   /71   Pulse 74   Wt 45.9 kg (101 lb 4.8 oz)   LMP 05/22/2022 (Approximate)   SpO2 97%   BMI 17.94 kg/m      Physical Exam  Constitutional:       General: She is not in acute distress.     Appearance: Normal appearance. She is not ill-appearing.   HENT:      Head: Normocephalic and atraumatic.      Nose: Nose normal. No congestion or rhinorrhea.      Mouth/Throat:      Mouth: Mucous membranes are moist.      Pharynx: Oropharynx is clear. She has posterior oropharyngeal  erythema.   Eyes:      General:         Right eye: No discharge.         Left eye: No discharge.   Cardiovascular:      Rate and Rhythm: Normal rate and regular rhythm.      Heart sounds: Normal heart sounds.   Pulmonary:      Effort: Pulmonary effort is normal.      Breath sounds: Normal breath sounds. No wheezing or rhonchi.   Skin:     General: Skin is warm.      Findings: No erythema or rash.   Neurological:      General: No focal deficit present.      Mental Status: She is alert. Mental status is at baseline.   Psychiatric:         Mood and Affect: Mood normal.         Behavior: Behavior normal.       ASSESSMENT/PLAN:  Tahmina Muhammad is a 21 year old female seen today with gastrointestinal symptoms of abdominal bloating, pain as well as persistent constipation for several years.  He has been getting worse over the last year.  She is concerned about a food allergy or food sensitivity.    In addition she has had numerous strep infections as well as an episode of bronchitis and ear infection in the last year.    1. Other adverse food reactions, not elsewhere classified, initial encounter  2. Constipation, unspecified constipation type  3. Recurrent infections  4. Chronic fatigue  5. Bloating    Will check lab work due to the history of recurrent infections and GI symptoms.    Will also refer to gastroenterology due to the significant gastrointestinal symptoms you are having of constipation, bloating, and abdominal pain.  Recurrent antibiotic use can also affect your GI system.    Elimination diet handout was provided.  This may help determine if you do have any food intolerances.    After thorough discussion, we will not plan to order any IgE or IgG based testing for foods.  We talked about how the symptoms that Tahmina has are not consistent with a food allergy and IgE based testing are not helpful.  IgG based testing is not available through the [facility] system.    - IgG; Future  - IgA; Future  - IgM;  Future  - Tissue transglutaminase bee IgA and IgG [[id number]]; Future  - Endomysial Antibody IgA by IFA [[id number]]; Future  - CBC with Platelets & Differential; Future  - Comprehensive metabolic panel; Future  - ESR: Erythrocyte sedimentation rate; Future  - Adult Gastro Ref - Consult Only; Future      Follow-up as needed.      Thank you for allowing me to participate in the care of Tahmina CALI Muhammad.      A total of 40 minutes was spent on the day of the encounter performing chart review, history and exam, documentation, and counseling and coordination of care as noted above.       Rolando Rudolph MD  Allergy/Immunology  [facility]

## 2022-06-17 NOTE — LETTER
6/17/2022         RE: Tahmina Muhammad  [address] [facility] [address]        Dear Colleague,    Thank you for referring your patient, Tahmina Muhammad, to the [facility] SPECIALTY [facility]. Please see a copy of my visit note below.    Tahmina Muhammad was seen in the Allergy Clinic at [facility].    Tahmina Muhammad is a 21 year old White female being seen today  in consultation for concern for food allergy or food intolerance.    She has had a several year history of having gastrointestinal symptoms including bloating, abdominal pain, constipation as a primary concern with intermittent diarrhea as well as fatigue.  She feels most of her life she has had GI problems.  She is wondering if there is a food allergy or food sensitivity.  This will occur after almost all meals where she has problems with bloating or abdominal pain.  She spends close to 2 to 2-1/2 hours in the bathroom daily trying to make a bowel movement.  She feels like she has the inability to make a complete bowel movement.  She feels her stomach does not completely empty.  6 weeks ago while on antibiotic she did have episodes of diarrhea with some minimal blood.  She is what feels like there is a correlation with her GI health and mental health as well as a correlation with her menstrual cycles.    Her stools are typically hard.  No current blood at this time.  She does not have any reflux at this time but has had problems with reflux in the past.  She did try to avoid gluten at one point with some improvement.  She has not seen gastroenterology.    She has had 3-4 episodes of strep infections.  She did have an episode of pancreatitis that according to the notes may have been alcohol related.  She also had a recent ear infection.  She has had several rounds of antibiotics this year.    Her weight has been stable.      History reviewed. No pertinent past medical history.  Family History   Problem Relation Age of Onset      Anemia Mother      History reviewed. No pertinent surgical history.    ENVIRONMENTAL HISTORY:   Pets inside the house include 4 cat(s).  Do you smoke cigarettes or other recreational drugs? No There is/are 0 smokers living in the house. The house does not have a damp basement.     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 2 of 3 of her roommates work as  and the last roommate has a babysitting job.    Review of Systems   Constitutional: Negative for activity change, chills and fever.   HENT: Negative for congestion, dental problem, ear pain, facial swelling, nosebleeds, postnasal drip, rhinorrhea, sinus pressure and sneezing.    Eyes: Negative for discharge, redness and itching.   Respiratory: Negative for cough, chest tightness, shortness of breath and wheezing.    Cardiovascular: Negative for chest pain.   Gastrointestinal: Negative for diarrhea, nausea and vomiting.   Musculoskeletal: Negative for arthralgias, joint swelling and myalgias.   Skin: Negative for rash.   Neurological: Negative for headaches.   Hematological: Negative for adenopathy.   Psychiatric/Behavioral: Negative for behavioral problems and self-injury.         Current Outpatient Medications:      benzonatate (TESSALON) 100 MG capsule, Take 100 mg by mouth 3 times daily as needed for cough (Patient not taking: Reported on 6/17/2022), Disp: , Rfl:      fluticasone (FLONASE) 50 MCG/ACT nasal spray, Spray 1 spray into both nostrils daily (Patient not taking: Reported on 6/17/2022), Disp: 16 g, Rfl: 0     ondansetron (ZOFRAN ODT) 4 MG ODT tab, Place 4 mg under the tongue (Patient not taking: Reported on 6/17/2022), Disp: , Rfl:   No Known Allergies      EXAM:   /71   Pulse 74   Wt 45.9 kg (101 lb 4.8 oz)   LMP 05/22/2022 (Approximate)   SpO2 97%   BMI 17.94 kg/m      Physical Exam  Constitutional:       General: She is not in acute distress.     Appearance: Normal appearance. She  is not ill-appearing.   HENT:      Head: Normocephalic and atraumatic.      Nose: Nose normal. No congestion or rhinorrhea.      Mouth/Throat:      Mouth: Mucous membranes are moist.      Pharynx: Oropharynx is clear. She has posterior oropharyngeal erythema.   Eyes:      General:         Right eye: No discharge.         Left eye: No discharge.   Cardiovascular:      Rate and Rhythm: Normal rate and regular rhythm.      Heart sounds: Normal heart sounds.   Pulmonary:      Effort: Pulmonary effort is normal.      Breath sounds: Normal breath sounds. No wheezing or rhonchi.   Skin:     General: Skin is warm.      Findings: No erythema or rash.   Neurological:      General: No focal deficit present.      Mental Status: She is alert. Mental status is at baseline.   Psychiatric:         Mood and Affect: Mood normal.         Behavior: Behavior normal.       ASSESSMENT/PLAN:  Tahmina Muhammad is a 21 year old female seen today with gastrointestinal symptoms of abdominal bloating, pain as well as persistent constipation for several years.  He has been getting worse over the last year.  She is concerned about a food allergy or food sensitivity.    In addition she has had numerous strep infections as well as an episode of bronchitis and ear infection in the last year.    1. Other adverse food reactions, not elsewhere classified, initial encounter  2. Constipation, unspecified constipation type  3. Recurrent infections  4. Chronic fatigue  5. Bloating    Will check lab work due to the history of recurrent infections and GI symptoms.    Will also refer to gastroenterology due to the significant gastrointestinal symptoms you are having of constipation, bloating, and abdominal pain.  Recurrent antibiotic use can also affect your GI system.    Elimination diet handout was provided.  This may help determine if you do have any food intolerances.    After thorough discussion, we will not plan to order any IgE or IgG based testing for  foods.  We talked about how the symptoms that Tahmina has are not consistent with a food allergy and IgE based testing are not helpful.  IgG based testing is not available through the [facility] system.    - IgG; Future  - IgA; Future  - IgM; Future  - Tissue transglutaminase bee IgA and IgG [OGR4259]; Future  - Endomysial Antibody IgA by IFA [[id number]]; Future  - CBC with Platelets & Differential; Future  - Comprehensive metabolic panel; Future  - ESR: Erythrocyte sedimentation rate; Future  - Adult Gastro Ref - Consult Only; Future      Follow-up as needed.      Thank you for allowing me to participate in the care of Tahmina Muhammad.      A total of 40 minutes was spent on the day of the encounter performing chart review, history and exam, documentation, and counseling and coordination of care as noted above.       Rolando Rudolph MD  Allergy/Immunology  [facility]        Again, thank you for allowing me to participate in the care of your patient.        Sincerely,        Rolando Rudolph MD

## 2022-06-19 LAB — ENDOMYSIUM IGA TITR SER IF: NORMAL {TITER}

## 2022-06-20 LAB
IGA SERPL-MCNC: 180 MG/DL (ref 84–499)
IGG SERPL-MCNC: 1606 MG/DL (ref 610–1616)
IGM SERPL-MCNC: 166 MG/DL (ref 35–242)
TTG IGA SER-ACNC: 0.5 U/ML
TTG IGG SER-ACNC: 1 U/ML

## 2022-06-24 LAB
BASOPHILS # BLD AUTO: 0 10E3/UL (ref 0–0.2)
BASOPHILS NFR BLD AUTO: 1 %
EOSINOPHIL # BLD AUTO: 0.1 10E3/UL (ref 0–0.7)
EOSINOPHIL NFR BLD AUTO: 2 %
ERYTHROCYTE [DISTWIDTH] IN BLOOD BY AUTOMATED COUNT: 11.7 % (ref 10–15)
HCT VFR BLD AUTO: 40.6 % (ref 35–47)
HGB BLD-MCNC: 13.3 G/DL (ref 11.7–15.7)
IMM GRANULOCYTES # BLD: 0 10E3/UL
IMM GRANULOCYTES NFR BLD: 0 %
LYMPHOCYTES # BLD AUTO: 1.1 10E3/UL (ref 0.8–5.3)
LYMPHOCYTES NFR BLD AUTO: 27 %
MCH RBC QN AUTO: 28.4 PG (ref 26.5–33)
MCHC RBC AUTO-ENTMCNC: 32.8 G/DL (ref 31.5–36.5)
MCV RBC AUTO: 87 FL (ref 78–100)
MONOCYTES # BLD AUTO: 0.4 10E3/UL (ref 0–1.3)
MONOCYTES NFR BLD AUTO: 11 %
NEUTROPHILS # BLD AUTO: 2.4 10E3/UL (ref 1.6–8.3)
NEUTROPHILS NFR BLD AUTO: 59 %
NRBC # BLD AUTO: 0 10E3/UL
NRBC BLD AUTO-RTO: 0 /100
PLATELET # BLD AUTO: 355 10E3/UL (ref 150–450)
RBC # BLD AUTO: 4.68 10E6/UL (ref 3.8–5.2)
WBC # BLD AUTO: 4 10E3/UL (ref 4–11)

## 2022-06-28 ENCOUNTER — TELEPHONE (OUTPATIENT)
Dept: ALLERGY | Facility: CLINIC | Age: 22
End: 2022-06-28

## 2022-06-28 NOTE — TELEPHONE ENCOUNTER
[facility] Call Center    Phone Message    May a detailed message be left on voicemail: yes     Reason for Call: Other: Pt Tahmina is seeking a referral to a different doctor to follow-up on her 6/17 appointment with Dr. Rudolph. Please call to discuss [phone]     Action Taken: Message routed to:  Other: [facility] All[facility]    Travel Screening: Not Applicable

## 2022-06-30 NOTE — TELEPHONE ENCOUNTER
Called patient and left a voicemail regarding telephone message about referral to a different provider. Call back to discuss at [phone].    ROSENDO SimsN, RN

## 2022-07-05 DIAGNOSIS — R69 DIAGNOSIS UNKNOWN: Primary | ICD-10-CM

## 2022-07-05 NOTE — PROGRESS NOTES
The patient had a referral that was closed due to multiple attempts made to schedule prior to 6/22.  Please review the referral and advise on how we should schedule.

## 2022-07-05 NOTE — TELEPHONE ENCOUNTER
Received phone call from patient's dad about referral from Dr. Rudolph. Referral for gastroenterology was put in for patient, soonest appointment for her to see GI is in February. Dad was wondering if they were to find a provider else where if Dr. Rudolph could send a referral there. Writer notified that Dr. Rudolph is not in clinic until Monday, but will ask when he returns if he would feel comfortable doing so. Patient's dad reports good understanding of the conversation.     ROSENDO SimsN, RN

## 2022-07-12 NOTE — TELEPHONE ENCOUNTER
Father Wilbur calling back saying that they were expecting a referral to [facility] and not received anything yet. Reviewed previous note with the Dad but he would like another follow-up. Please call [phone].

## 2022-07-13 ENCOUNTER — TRANSFERRED RECORDS (OUTPATIENT)
Dept: HEALTH INFORMATION MANAGEMENT | Facility: CLINIC | Age: 22
End: 2022-07-13

## 2022-07-13 LAB
ALT SERPL-CCNC: 20 IU/L (ref 0–32)
AST SERPL-CCNC: 24 IU/L (ref 0–40)
CREATININE (EXTERNAL): 0.66 MG/DL (ref 0.57–1)
GFR ESTIMATED (EXTERNAL): 129 ML/MIN/1.73
GLUCOSE (EXTERNAL): 94 MG/DL (ref 65–99)
POTASSIUM (EXTERNAL): 4.5 MMOL/L (ref 3.5–5.2)
TSH SERPL-ACNC: 0.83 UIU/ML (ref 0.45–4.5)

## 2022-07-14 NOTE — TELEPHONE ENCOUNTER
Called patient's dad and left a voicemail regarding referral for [facility]. Call to discuss at [phone].     ROSENDO SimsN, RN

## 2022-07-19 ENCOUNTER — OFFICE VISIT (OUTPATIENT)
Dept: ALLERGY | Facility: OTHER | Age: 22
End: 2022-07-19
Payer: COMMERCIAL

## 2022-07-19 VITALS
DIASTOLIC BLOOD PRESSURE: 67 MMHG | OXYGEN SATURATION: 100 % | HEART RATE: 69 BPM | BODY MASS INDEX: 18.12 KG/M2 | WEIGHT: 102.29 LBS | SYSTOLIC BLOOD PRESSURE: 99 MMHG | HEIGHT: 63 IN

## 2022-07-19 DIAGNOSIS — R14.0 BLOATING: ICD-10-CM

## 2022-07-19 DIAGNOSIS — K59.09 OTHER CONSTIPATION: Primary | ICD-10-CM

## 2022-07-19 PROCEDURE — 99214 OFFICE O/P EST MOD 30 MIN: CPT | Performed by: ALLERGY & IMMUNOLOGY

## 2022-07-19 ASSESSMENT — ENCOUNTER SYMPTOMS
COUGH: 0
EYE DISCHARGE: 0
EYE ITCHING: 0
RHINORRHEA: 0
FEVER: 0
FACIAL SWELLING: 0
HEADACHES: 0
CHEST TIGHTNESS: 0
EYE REDNESS: 0
SHORTNESS OF BREATH: 0
NAUSEA: 1
SINUS PRESSURE: 0
ARTHRALGIAS: 0
ADENOPATHY: 0
ABDOMINAL PAIN: 1
ACTIVITY CHANGE: 0
WHEEZING: 0
VOMITING: 0
DIARRHEA: 1
MYALGIAS: 0
CHILLS: 0

## 2022-07-19 ASSESSMENT — PAIN SCALES - GENERAL: PAINLEVEL: NO PAIN (0)

## 2022-07-19 NOTE — PATIENT INSTRUCTIONS
Allergy Staff Appt Hours Shot Hours Locations    Physician     Win Sher MD       Support Staff     LELA Suarez CMA  Tuesday:   [place] :  [place] :         [place]:  [place] [place]        Tuesday: : : : :  & Wed: :       Mon & Thurs:        Fri:          [facility]  [address] [place] [address]  Appt Line: [phone]    [facility]  [address] [place] [address]  Appt Line: [phone]    Pulmonary Function Scheduling:  [place]: [phone]  [place]: [phone]  [phone]     Prescription Assistance    If you need assistance with your prescriptions (cost, coverage, etc) please contact: [facility] Prescription Assistance Program [phone]    Important Scheduling Information    Appointments for skin testing: Appointment will last approximately 45 minutes.  Please call the appointment line for your clinic to schedule.  Discontinue oral antihistamines 7 days prior to the appointment.  Discontinue nasal and ocular antihistamines 4 days prior to appointment.    Appointments for challenges (oral food challenge, penicillin testing, aspirin desensitization) If your provider instructs you to that this additional testing is indicated, it will need to be scheduled directly through the allergy department.  Please contact them via [facility] or by calling the clinic and asking to speak with the allergy team.  They will provide additional information and instructions for the appointment.  Discontinue oral antihistamines 7 days prior to the appointment.  Discontinue nasal and ocular antihistamines 4 days prior to the appointment.    Thank you for trusting us with your care. Please feel free to contact us with any questions or concerns you may  have.

## 2022-07-19 NOTE — PROGRESS NOTES
SUBJECTIVE:                                                                   Tahmina Muhammad presents today to our Allergy Clinic at [facility] for a new patient visit/ for a follow up visit. She is a 21 year old female with concerns for food sensitivities.  The patient saw Dr. Rudolph in the past.  She is here for second opinion.  She has a several-year history of bloating, abdominal pain, constipation, intermittent diarrhea, and fatigue.  Constipation is the main problem.  She frequently has hard stools and needs to strain a lot when she defecates.  She may have some GI symptoms in the morning without eating anything, although after she eats, she feels worse.  She has no pruritus of the skin, urticaria, visible angioedema, throat closing sensation, tongue swelling, wheezing, chest tightness, or shortness of breath after eating.    She was seen by Dr. Rudolhp, at our [facility] allergy clinic.  She tried some elimination diet that did not really work.  She had unremarkable CBC and comprehensive metabolic panel.  Negative celiac panel.  Immunoglobulins within normal limits.  Slightly elevated ESR.  She was referred to GI.  She saw them.  Per patient, they suspect a motility problem and possible pelvic floor dysfunction.  She will also have colonoscopy done.  She just wants to make sure that I cannot offer any more food testing.      Patient Active Problem List   Diagnosis     Mood disorder (H)     Irregular bowel habits     Constipation, unspecified constipation type       History reviewed. No pertinent past medical history.   Problem (# of Occurrences) Relation (Name,Age of Onset)    Anemia (1) Mother       Negative family history of: Asthma, Allergic rhinitis        History reviewed. No pertinent surgical history.  Social History     Socioeconomic History     Marital status: Single     Spouse name: None     Number of children: None     Years of education: None     Highest education  level: None   Tobacco Use     Smoking status: Never Smoker     Smokeless tobacco: Never Used   Vaping Use     Vaping Use: Never used   Substance and Sexual Activity     Alcohol use: Yes     Alcohol/week: 12.0 standard drinks     Types: 12 Standard drinks or equivalent per week     Drug use: Yes     Types: Marijuana     Sexual activity: Yes     Partners: Female   Social History Narrative    ENVIRONMENTAL HISTORY: [place] The home is heated with a forced air. They do have central air conditioning. The patient's bedroom is furnished with carpeting in bedroom.  Pets inside the house include 3 cat(s). There is no history of cockroach or mice infestation. There is/are 0 smokers in the house.  The house does not have a damp basement.            Review of Systems   Constitutional: Negative for activity change, chills and fever.   HENT: Negative for congestion, ear discharge, facial swelling, nosebleeds, postnasal drip, rhinorrhea, sinus pressure and sneezing.    Eyes: Negative for discharge, redness and itching.   Respiratory: Negative for cough, chest tightness, shortness of breath and wheezing.    Cardiovascular: Negative for chest pain.   Gastrointestinal: Positive for abdominal pain, diarrhea and nausea. Negative for vomiting.   Musculoskeletal: Negative for arthralgias and myalgias.   Skin: Negative for rash.   Allergic/Immunologic: Negative for environmental allergies.   Neurological: Negative for headaches.   Hematological: Negative for adenopathy.   Psychiatric/Behavioral: Negative for self-injury.           Current Outpatient Medications:      benzonatate (TESSALON) 100 MG capsule, Take 100 mg by mouth 3 times daily as needed for cough (Patient not taking: Reported on 7/19/2022), Disp: , Rfl:      fluticasone (FLONASE) 50 MCG/ACT nasal spray, Spray 1 spray into both nostrils daily (Patient not taking: Reported on 7/19/2022), Disp: 16 g, Rfl: 0     ondansetron (ZOFRAN  "ODT) 4 MG ODT tab, Place 4 mg under the tongue (Patient not taking: Reported on 7/19/2022), Disp: , Rfl:   Immunization History   Administered Date(s) Administered     COVID-19,PF,Moderna 04/10/2021, 05/08/2021     DTaP, Unspecified 2001, 01/29/2002, 03/25/2002, 12/27/2002, 09/22/2006     HepB, Unspecified 2001, 01/29/2002, 12/27/2002     Hib, Unspecified 2001, 01/29/2002, 12/27/2002     MMR 09/24/2002, 09/22/2006     Pneumococcal (PCV 7) 2001, 01/29/2002, 03/25/2002, 08/01/2003     Poliovirus, inactivated (IPV) 2001, 01/29/2002, 03/25/2002, 09/21/2005     Varicella 12/27/2002, 09/22/2006     No Known Allergies  OBJECTIVE:                                                                 BP 99/67   Pulse 69   Ht 1.6 m (5' 3\")   Wt 46.4 kg (102 lb 4.7 oz)   LMP 05/22/2022 (Approximate)   SpO2 100%   BMI 18.12 kg/m          Physical Exam  Vitals and nursing note reviewed.   Constitutional:       General: She is not in acute distress.     Appearance: She is not diaphoretic.   HENT:      Head: Normocephalic and atraumatic.      Right Ear: Tympanic membrane, ear canal and external ear normal.      Left Ear: Tympanic membrane, ear canal and external ear normal.      Nose: No mucosal edema or rhinorrhea.   Eyes:      General:         Right eye: No discharge.         Left eye: No discharge.      Conjunctiva/sclera: Conjunctivae normal.   Musculoskeletal:         General: Normal range of motion.   Neurological:      Mental Status: She is alert and oriented to person, place, and time.   Psychiatric:         Mood and Affect: Mood normal.         Behavior: Behavior normal.             ASSESSMENT/PLAN:    Other constipation  Bloating    Discussed IgE mediated symptoms associated with food allergies.  The patient does not have them.  I do not think that testing for food allergy is necessary.  - She will continue following up with GI.       Return if symptoms worsen or fail to improve.    Thank " you for allowing us to participate in the care of this patient. Please feel free to contact us if there are any questions or concerns about the patient.    Disclaimer: This note consists of symbols derived from keyboarding, dictation and/or voice recognition software. As a result, there may be errors in the script that have gone undetected. Please consider this when interpreting information found in this chart.    Win Sher MD, FAAAAI, FACAAI  Allergy, Asthma and Immunology     [facility]

## 2022-07-19 NOTE — LETTER
7/19/2022         RE: Tahmina Muhammad  [address] [facility] [address]        Dear Colleague,    Thank you for referring your patient, Tahmina Muhammad, to the [facility]. Please see a copy of my visit note below.    SUBJECTIVE:                                                                   Tahmina Muhammad presents today to our Allergy Clinic at [facility] for a new patient visit/ for a follow up visit. She is a 21 year old female with concerns for food sensitivities.  The patient saw Dr. Rudolph in the past.  She is here for second opinion.  She has a several-year history of bloating, abdominal pain, constipation, intermittent diarrhea, and fatigue.  Constipation is the main problem.  She frequently has hard stools and needs to strain a lot when she defecates.  She may have some GI symptoms in the morning without eating anything, although after she eats, she feels worse.  She has no pruritus of the skin, urticaria, visible angioedema, throat closing sensation, tongue swelling, wheezing, chest tightness, or shortness of breath after eating.    She was seen by Dr. Rudolph, at our [facility] allergy clinic.  She tried some elimination diet that did not really work.  She had unremarkable CBC and comprehensive metabolic panel.  Negative celiac panel.  Immunoglobulins within normal limits.  Slightly elevated ESR.  She was referred to GI.  She saw them.  Per patient, they suspect a motility problem and possible pelvic floor dysfunction.  She will also have colonoscopy done.  She just wants to make sure that I cannot offer any more food testing.      Patient Active Problem List   Diagnosis     Mood disorder (H)     Irregular bowel habits     Constipation, unspecified constipation type       History reviewed. No pertinent past medical history.   Problem (# of Occurrences) Relation (Name,Age of Onset)    Anemia (1) Mother       Negative family history of:  Asthma, Allergic rhinitis        History reviewed. No pertinent surgical history.  Social History     Socioeconomic History     Marital status: Single     Spouse name: None     Number of children: None     Years of education: None     Highest education level: None   Tobacco Use     Smoking status: Never Smoker     Smokeless tobacco: Never Used   Vaping Use     Vaping Use: Never used   Substance and Sexual Activity     Alcohol use: Yes     Alcohol/week: 12.0 standard drinks     Types: 12 Standard drinks or equivalent per week     Drug use: Yes     Types: Marijuana     Sexual activity: Yes     Partners: Female   Social History Narrative    ENVIRONMENTAL HISTORY: [place] The home is heated with a forced air. They do have central air conditioning. The patient's bedroom is furnished with carpeting in bedroom.  Pets inside the house include 3 cat(s). There is no history of cockroach or mice infestation. There is/are 0 smokers in the house.  The house does not have a damp basement.            Review of Systems   Constitutional: Negative for activity change, chills and fever.   HENT: Negative for congestion, ear discharge, facial swelling, nosebleeds, postnasal drip, rhinorrhea, sinus pressure and sneezing.    Eyes: Negative for discharge, redness and itching.   Respiratory: Negative for cough, chest tightness, shortness of breath and wheezing.    Cardiovascular: Negative for chest pain.   Gastrointestinal: Positive for abdominal pain, diarrhea and nausea. Negative for vomiting.   Musculoskeletal: Negative for arthralgias and myalgias.   Skin: Negative for rash.   Allergic/Immunologic: Negative for environmental allergies.   Neurological: Negative for headaches.   Hematological: Negative for adenopathy.   Psychiatric/Behavioral: Negative for self-injury.           Current Outpatient Medications:      benzonatate (TESSALON) 100 MG capsule, Take 100 mg by mouth 3 times daily as  "needed for cough (Patient not taking: Reported on 7/19/2022), Disp: , Rfl:      fluticasone (FLONASE) 50 MCG/ACT nasal spray, Spray 1 spray into both nostrils daily (Patient not taking: Reported on 7/19/2022), Disp: 16 g, Rfl: 0     ondansetron (ZOFRAN ODT) 4 MG ODT tab, Place 4 mg under the tongue (Patient not taking: Reported on 7/19/2022), Disp: , Rfl:   Immunization History   Administered Date(s) Administered     COVID-19,PF,Moderna 04/10/2021, 05/08/2021     DTaP, Unspecified 2001, 01/29/2002, 03/25/2002, 12/27/2002, 09/22/2006     HepB, Unspecified 2001, 01/29/2002, 12/27/2002     Hib, Unspecified 2001, 01/29/2002, 12/27/2002     MMR 09/24/2002, 09/22/2006     Pneumococcal (PCV 7) 2001, 01/29/2002, 03/25/2002, 08/01/2003     Poliovirus, inactivated (IPV) 2001, 01/29/2002, 03/25/2002, 09/21/2005     Varicella 12/27/2002, 09/22/2006     No Known Allergies  OBJECTIVE:                                                                 BP 99/67   Pulse 69   Ht 1.6 m (5' 3\")   Wt 46.4 kg (102 lb 4.7 oz)   LMP 05/22/2022 (Approximate)   SpO2 100%   BMI 18.12 kg/m          Physical Exam  Vitals and nursing note reviewed.   Constitutional:       General: She is not in acute distress.     Appearance: She is not diaphoretic.   HENT:      Head: Normocephalic and atraumatic.      Right Ear: Tympanic membrane, ear canal and external ear normal.      Left Ear: Tympanic membrane, ear canal and external ear normal.      Nose: No mucosal edema or rhinorrhea.   Eyes:      General:         Right eye: No discharge.         Left eye: No discharge.      Conjunctiva/sclera: Conjunctivae normal.   Musculoskeletal:         General: Normal range of motion.   Neurological:      Mental Status: She is alert and oriented to person, place, and time.   Psychiatric:         Mood and Affect: Mood normal.         Behavior: Behavior normal.             ASSESSMENT/PLAN:    Other constipation  Bloating    Discussed " IgE mediated symptoms associated with food allergies.  The patient does not have them.  I do not think that testing for food allergy is necessary.  - She will continue following up with GI.       Return if symptoms worsen or fail to improve.    Thank you for allowing us to participate in the care of this patient. Please feel free to contact us if there are any questions or concerns about the patient.    Disclaimer: This note consists of symbols derived from keyboarding, dictation and/or voice recognition software. As a result, there may be errors in the script that have gone undetected. Please consider this when interpreting information found in this chart.    Win Sher MD, FAAAAI, FACAAI  Allergy, Asthma and Immunology     [facility]       Again, thank you for allowing me to participate in the care of your patient.        Sincerely,        Win Sher MD

## 2022-07-23 DIAGNOSIS — H66.005 RECURRENT ACUTE SUPPURATIVE OTITIS MEDIA WITHOUT SPONTANEOUS RUPTURE OF LEFT TYMPANIC MEMBRANE: ICD-10-CM

## 2022-07-25 RX ORDER — FLUTICASONE PROPIONATE 50 MCG
SPRAY, SUSPENSION (ML) NASAL
Qty: 16 ML | Refills: 1 | Status: SHIPPED | OUTPATIENT
Start: 2022-07-25 | End: 2022-10-17

## 2022-08-09 ENCOUNTER — TELEPHONE (OUTPATIENT)
Dept: ALLERGY | Facility: CLINIC | Age: 22
End: 2022-08-09

## 2022-08-09 NOTE — TELEPHONE ENCOUNTER
Reviewed last office visit notes from Dr Sher on 7/19 in [place] and Dr Rudolph 6/17/2022 in [place]. No mention of food challenge. No labs done in relation to food allergies.     Will send request to DR Sher for review.     Becky SALGADO RN  Specialty/Allergy Clinics

## 2022-08-10 ENCOUNTER — LAB REQUISITION (OUTPATIENT)
Dept: LAB | Facility: CLINIC | Age: 22
End: 2022-08-10

## 2022-08-10 DIAGNOSIS — R10.2 PELVIC AND PERINEAL PAIN: ICD-10-CM

## 2022-08-10 PROCEDURE — 87491 CHLMYD TRACH DNA AMP PROBE: CPT | Performed by: ADVANCED PRACTICE MIDWIFE

## 2022-08-10 NOTE — TELEPHONE ENCOUNTER
Left message on answering machine for patient to call back.        Nya BENSON  RN  Specialty/Allergy Clinic

## 2022-08-11 ENCOUNTER — OFFICE VISIT (OUTPATIENT)
Dept: PEDIATRICS | Facility: CLINIC | Age: 22
End: 2022-08-11
Payer: COMMERCIAL

## 2022-08-11 VITALS
SYSTOLIC BLOOD PRESSURE: 94 MMHG | DIASTOLIC BLOOD PRESSURE: 60 MMHG | OXYGEN SATURATION: 99 % | HEART RATE: 54 BPM | BODY MASS INDEX: 18.07 KG/M2 | WEIGHT: 102 LBS

## 2022-08-11 DIAGNOSIS — R14.0 GENERALIZED BLOATING: Primary | ICD-10-CM

## 2022-08-11 DIAGNOSIS — R10.84 ABDOMINAL PAIN, GENERALIZED: ICD-10-CM

## 2022-08-11 LAB
C TRACH DNA SPEC QL PROBE+SIG AMP: NEGATIVE
N GONORRHOEA DNA SPEC QL NAA+PROBE: NEGATIVE

## 2022-08-11 PROCEDURE — 99203 OFFICE O/P NEW LOW 30 MIN: CPT | Performed by: STUDENT IN AN ORGANIZED HEALTH CARE EDUCATION/TRAINING PROGRAM

## 2022-08-11 PROCEDURE — 36415 COLL VENOUS BLD VENIPUNCTURE: CPT | Performed by: STUDENT IN AN ORGANIZED HEALTH CARE EDUCATION/TRAINING PROGRAM

## 2022-08-11 PROCEDURE — 86258 DGP ANTIBODY EACH IG CLASS: CPT | Performed by: STUDENT IN AN ORGANIZED HEALTH CARE EDUCATION/TRAINING PROGRAM

## 2022-08-11 PROCEDURE — 86364 TISS TRNSGLTMNASE EA IG CLAS: CPT | Performed by: STUDENT IN AN ORGANIZED HEALTH CARE EDUCATION/TRAINING PROGRAM

## 2022-08-11 PROCEDURE — 86231 EMA EACH IG CLASS: CPT | Mod: 90 | Performed by: STUDENT IN AN ORGANIZED HEALTH CARE EDUCATION/TRAINING PROGRAM

## 2022-08-11 PROCEDURE — 99000 SPECIMEN HANDLING OFFICE-LAB: CPT | Performed by: STUDENT IN AN ORGANIZED HEALTH CARE EDUCATION/TRAINING PROGRAM

## 2022-08-11 PROCEDURE — 82784 ASSAY IGA/IGD/IGG/IGM EACH: CPT | Performed by: STUDENT IN AN ORGANIZED HEALTH CARE EDUCATION/TRAINING PROGRAM

## 2022-08-11 NOTE — PROGRESS NOTES
Assessment & Plan     Generalized bloating    - IgA [LAB73]  - Deamidated Giladin Peptide Bee IgA IgG [XUL2943]  - Tissue transglutaminase bee IgA and IgG [XXN0677]  - Endomysial Antibody IgA by IFA [ZAP1076]  - IgA [LAB73]  - Deamidated Giladin Peptide Bee IgA IgG [SUM1229]  - Tissue transglutaminase bee IgA and IgG [CAY9770]  - Endomysial Antibody IgA by IFA [[id number]]    Abdominal pain, generalized    - IgA [LAB73]  - Deamidated Giladin Peptide Bee IgA IgG [[id number]]  - Tissue transglutaminase bee IgA and IgG [[id number]]  - Endomysial Antibody IgA by IFA [[id number]]  - IgA [LAB73]  - Deamidated Giladin Peptide Bee IgA IgG [QCD1688]  - Tissue transglutaminase bee IgA and IgG [[id number]]  - Endomysial Antibody IgA by IFA [[id number]]    Kasia has ongoing generalized abdominal discomfort, difficulty having regular bowel movements, and bloating. Previously tested for celiac disease whilie on a gluten free diet.  She has returned to eating gluten for the past month and wants to be tested again.     Recommend to establish care with adult provider. Discussed HPV vaccine. She is going to discuss with her gynecologist she is seeing for pelvic pain.                  No follow-ups on file.    Cammy COHEN MD  [facility]   Tahmina is a 21 year old, presenting for the following health issues:  Celiac Disease  (Needs new test . She was not eating Gluten when took the orignal test. She has been eating gluten for 1 month. Would like to be retested )      HPI     Symptoms: bloating, nausea, anxiety uptick since restarting gluten 1 month ago  Digestive issues have gotten worse this past month when eating gluten  Had been on gluten free when tested for celiac  previously  Has been eating at least 1 slice of bread a day    Noted symptoms of digestive issues a few years ago  Felt better with gluten free diet for 4 months  Want an accurate test    NO blood in stool   Will be having a  colonscopy soon  Unable to stool every day  Difficult to relax to have a bowel movement    Has seen GI previously - will be getting colonscopy on August 15.   Seeing OB/ gyn now for pelvic pain/ pelvic floor evaluation    Patient Active Problem List    Diagnosis Date Noted     Mood disorder (H) 07/15/2021     Priority: Medium     Irregular bowel habits 07/15/2021     Priority: Medium     Constipation, unspecified constipation type 07/15/2021     Priority: Medium       Review of Systems   Constitutional, HEENT, cardiovascular, pulmonary, gi and gu systems are negative, except as otherwise noted.      Objective    BP 94/60   Pulse 54   Wt 102 lb (46.3 kg)   LMP 07/30/2022 (Approximate)   SpO2 99%   BMI 18.07 kg/m    Body mass index is 18.07 kg/m .  Physical Exam   GENERAL: healthy, alert and no distress  NECK: no adenopathy, no asymmetry, masses, or scars and thyroid normal to palpation  RESP: lungs clear to auscultation - no rales, rhonchi or wheezes  CV: regular rate and rhythm, normal S1 S2, no S3 or S4, no murmur, click or rub, no peripheral edema and peripheral pulses strong  ABDOMEN: soft, nontender, no hepatosplenomegaly, no masses and bowel sounds normal  MS: no gross musculoskeletal defects noted, no edema                    .  ..

## 2022-08-12 LAB
GLIADIN IGA SER-ACNC: 2.1 U/ML
GLIADIN IGG SER-ACNC: 1.1 U/ML
IGA SERPL-MCNC: 152 MG/DL (ref 84–499)
TTG IGA SER-ACNC: 0.3 U/ML
TTG IGG SER-ACNC: 1.1 U/ML

## 2022-08-13 LAB — ENDOMYSIUM IGA TITR SER IF: NORMAL {TITER}

## 2022-08-15 ENCOUNTER — TRANSFERRED RECORDS (OUTPATIENT)
Dept: HEALTH INFORMATION MANAGEMENT | Facility: CLINIC | Age: 22
End: 2022-08-15

## 2022-08-21 ENCOUNTER — HEALTH MAINTENANCE LETTER (OUTPATIENT)
Age: 22
End: 2022-08-21

## 2022-08-24 ENCOUNTER — LAB REQUISITION (OUTPATIENT)
Dept: LAB | Facility: CLINIC | Age: 22
End: 2022-08-24

## 2022-08-24 DIAGNOSIS — Z11.3 ENCOUNTER FOR SCREENING FOR INFECTIONS WITH A PREDOMINANTLY SEXUAL MODE OF TRANSMISSION: ICD-10-CM

## 2022-08-24 PROCEDURE — 87389 HIV-1 AG W/HIV-1&-2 AB AG IA: CPT | Performed by: ADVANCED PRACTICE MIDWIFE

## 2022-08-24 PROCEDURE — 86592 SYPHILIS TEST NON-TREP QUAL: CPT | Performed by: ADVANCED PRACTICE MIDWIFE

## 2022-08-25 LAB
HIV 1+2 AB+HIV1 P24 AG SERPL QL IA: NONREACTIVE
RPR SER QL: NONREACTIVE

## 2022-10-05 ENCOUNTER — VIRTUAL VISIT (OUTPATIENT)
Dept: GASTROENTEROLOGY | Facility: CLINIC | Age: 22
End: 2022-10-05
Payer: COMMERCIAL

## 2022-10-05 DIAGNOSIS — R14.0 BLOATING: ICD-10-CM

## 2022-10-05 DIAGNOSIS — R69 DIAGNOSIS UNKNOWN: ICD-10-CM

## 2022-10-05 DIAGNOSIS — K59.00 CONSTIPATION, UNSPECIFIED CONSTIPATION TYPE: ICD-10-CM

## 2022-10-05 PROCEDURE — 99204 OFFICE O/P NEW MOD 45 MIN: CPT | Mod: 95 | Performed by: INTERNAL MEDICINE

## 2022-10-05 ASSESSMENT — PATIENT HEALTH QUESTIONNAIRE - PHQ9: SUM OF ALL RESPONSES TO PHQ QUESTIONS 1-9: 16

## 2022-10-05 NOTE — PATIENT INSTRUCTIONS
I will request your previous records and let you know if it changes anything that we are doing for you.  Please continue to follow with the pelvic floor clinic.  Please continue to work with your therapist and complete the testing they have ordered.

## 2022-10-05 NOTE — PROGRESS NOTES
Tahmina is a 22 year old who is being evaluated via a billable video visit.      How would you like to obtain your AVS? MyChart  If the video visit is dropped, the invitation should be resent by: Send to e-mail at: [email or web address]  Will anyone else be joining your video visit? No
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
## 2022-10-10 ENCOUNTER — DOCUMENTATION ONLY (OUTPATIENT)
Dept: GASTROENTEROLOGY | Facility: CLINIC | Age: 22
End: 2022-10-10

## 2022-10-10 NOTE — CONFIDENTIAL NOTE
CAN contacted [facility] medical records office per Dr. Toscano's request.    Per Dr. Toscano,  I need her records from [facility] and the pelvic floor clinic   Thanks     CAN spoke with [facility] medical records staff, Nisa. Nisa stated that she will fax records to our clinic today. CAN provided with fax number.    CAN spoke with [facility] records office's staff. She stated that they have no records from patient on file. [facility] will inform Dr. Toscano.      Jennifer Waldrop CMA

## 2022-10-10 NOTE — CONFIDENTIAL NOTE
CAN received the following records from [facility]:  - Office visit note 07/12/2022  - Lab results 07/14/2022  - Lab results 07/18/2022  - Colonoscopy path results letter 08/16/2022  - Colonoscopy report 08/15/2022  - Colonoscopy path results 08/16/2022    Records sent out for scanning on 10/10/2022.    Jennifer Waldrop CMA

## 2022-10-16 PROBLEM — K64.9 HEMORRHOID: Status: ACTIVE | Noted: 2022-10-16

## 2022-10-16 PROBLEM — K62.6 RECTAL ULCER: Status: ACTIVE | Noted: 2022-10-16

## 2022-10-17 ENCOUNTER — OFFICE VISIT (OUTPATIENT)
Dept: PEDIATRICS | Facility: CLINIC | Age: 22
End: 2022-10-17
Payer: COMMERCIAL

## 2022-10-17 VITALS
DIASTOLIC BLOOD PRESSURE: 72 MMHG | BODY MASS INDEX: 17.71 KG/M2 | HEART RATE: 64 BPM | WEIGHT: 100 LBS | OXYGEN SATURATION: 99 % | SYSTOLIC BLOOD PRESSURE: 115 MMHG

## 2022-10-17 DIAGNOSIS — Z28.20 VACCINE REFUSED BY PATIENT: ICD-10-CM

## 2022-10-17 DIAGNOSIS — K64.9 HEMORRHOIDS, UNSPECIFIED HEMORRHOID TYPE: ICD-10-CM

## 2022-10-17 DIAGNOSIS — K59.09 CHRONIC CONSTIPATION: ICD-10-CM

## 2022-10-17 DIAGNOSIS — R73.09 ELEVATED GLUCOSE LEVEL: ICD-10-CM

## 2022-10-17 DIAGNOSIS — K62.6 RECTAL ULCER: ICD-10-CM

## 2022-10-17 DIAGNOSIS — Z87.19 HX OF ACUTE PANCREATITIS: Primary | ICD-10-CM

## 2022-10-17 DIAGNOSIS — K31.84 GASTROPARESIS: ICD-10-CM

## 2022-10-17 LAB
AMYLASE SERPL-CCNC: 66 U/L (ref 28–100)
FASTING STATUS PATIENT QL REPORTED: YES
GLUCOSE SERPL-MCNC: 113 MG/DL (ref 70–99)
HBA1C MFR BLD: 5.1 % (ref 0–5.6)
LIPASE SERPL-CCNC: 48 U/L (ref 13–60)

## 2022-10-17 PROCEDURE — 83036 HEMOGLOBIN GLYCOSYLATED A1C: CPT | Performed by: PEDIATRICS

## 2022-10-17 PROCEDURE — 82947 ASSAY GLUCOSE BLOOD QUANT: CPT | Performed by: PEDIATRICS

## 2022-10-17 PROCEDURE — 36415 COLL VENOUS BLD VENIPUNCTURE: CPT | Performed by: PEDIATRICS

## 2022-10-17 PROCEDURE — 82150 ASSAY OF AMYLASE: CPT | Performed by: PEDIATRICS

## 2022-10-17 PROCEDURE — 83690 ASSAY OF LIPASE: CPT | Performed by: PEDIATRICS

## 2022-10-17 PROCEDURE — 99214 OFFICE O/P EST MOD 30 MIN: CPT | Performed by: PEDIATRICS

## 2022-10-17 NOTE — PROGRESS NOTES
Assessment & Plan     Hx of acute pancreatitis  - Amylase  - Lipase    Gastroparesis  - Glucose  - Hemoglobin A1c      Chronic constipation  Rectal ulcer  Hemorrhoids, unspecified hemorrhoid type  Strong recommendation for 1 capful of miralax daily for several months until constipation resolved  She is resistant to medication but encouraged her to try this - prescription declined      Vaccine refused by patient  HPV, influenza, COVID      Review of prior external note(s) from - [facility] visit 10/5 and [facility] notes from this summer  Review of the result(s) of each unique test - lab tests [facility] 7/2022, celiac testing 8/22 and colonscopy  Ordering of each unique test  35 minutes spent on the date of the encounter doing chart review, history and exam, documentation and further activities per the note           Return in 1 month (on 11/17/2022) for Routine preventive with adult medicine.    Miranda Lange MD  [facility]    Rebeka Martel is a 22 year old accompanied by her self, presenting for the following health issues:  Patient Request (Would like to be tested for diabetes/Concerns about pancreas - hx of pancreatitis/Has had a lot of GI issues and is being followed by GI)      Tahmina has long-standing constipation and GI issues. She was seen by [facility] and had lab work and a colonoscopy this summer. Her colonoscopy showed rectal ulcers and hemorrhoids. She dislikes taking medication and is not on any laxatives. She is seeing PT for pelvic floor exercises.     She had elevated lipase levels after alcohol intoxication 6/2022. She says she has had pancreatitis in the past as well. She would like her pancreatic enzymes tested today as well as a diabetes test. She says she has bloating and abdominal tightness as well as gastroparesis, fatigue and blurry vision. She is nearsighted and does have prescription lenses.     She had normal celiac screening 8/2022 and normal Mg, ESR, TSH, T4, CMP,  CBC 7/2022.        Objective    /72   Pulse 64   Wt 100 lb (45.4 kg)   LMP 10/03/2022   SpO2 99%   BMI 17.71 kg/m    Body mass index is 17.71 kg/m .  Physical Exam   GENERAL: healthy, alert and no distress  Further exam declined    Results for orders placed or performed in visit on 10/17/22   Hemoglobin A1c     Status: Normal   Result Value Ref Range    Hemoglobin A1C 5.1 0.0 - 5.6 %

## 2022-11-03 ENCOUNTER — OFFICE VISIT (OUTPATIENT)
Dept: PEDIATRICS | Facility: CLINIC | Age: 22
End: 2022-11-03
Payer: COMMERCIAL

## 2022-11-03 VITALS
HEART RATE: 59 BPM | SYSTOLIC BLOOD PRESSURE: 114 MMHG | DIASTOLIC BLOOD PRESSURE: 80 MMHG | TEMPERATURE: 97.2 F | WEIGHT: 104.5 LBS | OXYGEN SATURATION: 98 % | BODY MASS INDEX: 18.51 KG/M2

## 2022-11-03 DIAGNOSIS — R14.0 ABDOMINAL BLOATING: ICD-10-CM

## 2022-11-03 DIAGNOSIS — R10.84 ABDOMINAL PAIN, GENERALIZED: Primary | ICD-10-CM

## 2022-11-03 LAB
CHOLEST SERPL-MCNC: 134 MG/DL
FASTING STATUS PATIENT QL REPORTED: NO
FERRITIN SERPL-MCNC: 54 NG/ML (ref 12–150)
HDLC SERPL-MCNC: 54 MG/DL
LDLC SERPL CALC-MCNC: 70 MG/DL
MAGNESIUM SERPL-MCNC: 2.1 MG/DL (ref 1.6–2.3)
NONHDLC SERPL-MCNC: 80 MG/DL
PHOSPHATE SERPL-MCNC: 4.3 MG/DL (ref 2.5–4.5)
TRIGL SERPL-MCNC: 49 MG/DL

## 2022-11-03 PROCEDURE — 99000 SPECIMEN HANDLING OFFICE-LAB: CPT | Performed by: PEDIATRICS

## 2022-11-03 PROCEDURE — 99214 OFFICE O/P EST MOD 30 MIN: CPT | Performed by: PEDIATRICS

## 2022-11-03 PROCEDURE — 83540 ASSAY OF IRON: CPT | Performed by: PEDIATRICS

## 2022-11-03 PROCEDURE — 82306 VITAMIN D 25 HYDROXY: CPT | Performed by: PEDIATRICS

## 2022-11-03 PROCEDURE — 82607 VITAMIN B-12: CPT | Performed by: PEDIATRICS

## 2022-11-03 PROCEDURE — 83550 IRON BINDING TEST: CPT | Performed by: PEDIATRICS

## 2022-11-03 PROCEDURE — 82330 ASSAY OF CALCIUM: CPT | Performed by: PEDIATRICS

## 2022-11-03 PROCEDURE — 82728 ASSAY OF FERRITIN: CPT | Performed by: PEDIATRICS

## 2022-11-03 PROCEDURE — 83735 ASSAY OF MAGNESIUM: CPT | Performed by: PEDIATRICS

## 2022-11-03 PROCEDURE — 86003 ALLG SPEC IGE CRUDE XTRC EA: CPT | Mod: 59 | Performed by: PEDIATRICS

## 2022-11-03 PROCEDURE — 84207 ASSAY OF VITAMIN B-6: CPT | Mod: 90 | Performed by: PEDIATRICS

## 2022-11-03 PROCEDURE — 80061 LIPID PANEL: CPT | Performed by: PEDIATRICS

## 2022-11-03 PROCEDURE — 84100 ASSAY OF PHOSPHORUS: CPT | Performed by: PEDIATRICS

## 2022-11-03 PROCEDURE — 36415 COLL VENOUS BLD VENIPUNCTURE: CPT | Performed by: PEDIATRICS

## 2022-11-03 NOTE — LETTER
November 7, 2022      Tahmina Muhammad  [address]  [place] [address]        Dear ,    We are writing to inform you of your test results.     Tahmina has vit d deficiency.   Recommend Tahmina  take OTC vit d 2000 u  qd.  All other labs nl       Resulted Orders   Ferritin   Result Value Ref Range    Ferritin 54 12 - 150 ng/mL   Iron & Iron Binding Capacity   Result Value Ref Range    Iron 122 35 - 180 ug/dL    Iron Binding Capacity 327 240 - 430 ug/dL    Iron Sat Index 37 15 - 46 %   Lipid Profile   Result Value Ref Range    Cholesterol 134 <200 mg/dL    Triglycerides 49 <150 mg/dL    Direct Measure HDL 54 >=50 mg/dL    LDL Cholesterol Calculated 70 <=100 mg/dL    Non HDL Cholesterol 80 <130 mg/dL    Patient Fasting > 8hrs? No     Narrative    Cholesterol  Desirable:  <200 mg/dL    Triglycerides  Normal:  Less than 150 mg/dL  Borderline High:  150-199 mg/dL  High:  200-499 mg/dL  Very High:  Greater than or equal to 500 mg/dL    Direct Measure HDL  Female:  Greater than or equal to 50 mg/dL   Male:  Greater than or equal to 40 mg/dL    LDL Cholesterol  Desirable:  <100mg/dL  Above Desirable:  100-129 mg/dL   Borderline High:  130-159 mg/dL   High:  160-189 mg/dL   Very High:  >= 190 mg/dL    Non HDL Cholesterol  Desirable:  130 mg/dL  Above Desirable:  130-159 mg/dL  Borderline High:  160-189 mg/dL  High:  190-219 mg/dL  Very High:  Greater than or equal to 220 mg/dL   Vitamin D Deficiency   Result Value Ref Range    Vitamin D, Total (25-Hydroxy) 19 (L) 20 - 75 ug/L    Narrative    Season, race, dietary intake, and treatment affect the concentration of 25-hydroxy-Vitamin D. Values may decrease during winter months and increase during summer months. Values 20-29 ug/L may indicate Vitamin D insufficiency and values <20 ug/L may indicate Vitamin D deficiency.    Vitamin D determination is routinely performed by an immunoassay specific for 25 hydroxyvitamin D3.  If an individual is on vitamin D2(ergocalciferol)  supplementation, please specify 25 OH vitamin D2 and D3 level determination by LCMSMS test VITD23.     **Vitamin B12 FUTURE 2mo   Result Value Ref Range    Vitamin B12 532 232 - 1,245 pg/mL   Ionized Calcium   Result Value Ref Range    Calcium Ionized 5.0 4.4 - 5.2 mg/dL   Allergy adult food panel   Result Value Ref Range    Immunoglobulin E 11 0 - 114 kU/L    [place], Food IgE <0.10 <0.10 KU(A)/L      Comment:      Interpretation: None Detected    Cashew, Food IgE <0.10 <0.10 KU(A)/L      Comment:      Interpretation: None Detected    Codfish IgE <0.10 <0.10 KU(A)/L      Comment:      Interpretation: None Detected    Egg White IgE <0.10 <0.10 KU(A)/L      Comment:      Interpretation: None Detected    Hazelnut IgE <0.10 <0.10 KU(A)/L      Comment:      Interpretation: None Detected    Milk, Cow IgE <0.10 <0.10 KU(A)/L      Comment:      Interpretation: None Detected    Peanut IgE <0.10 <0.10 KU(A)/L      Comment:      Interpretation: None Detected    [place] IgE <0.10 <0.10 KU(A)/L      Comment:      Interpretation: None Detected    Scallop IgE <0.10 <0.10 KU(A)/L      Comment:      Interpretation: None Detected    Sesame Seed IgE <0.10 <0.10 KU(A)/L      Comment:      Interpretation: None Detected    Shrimp IgE <0.10 <0.10 KU(A)/L      Comment:      Interpretation: None Detected    Soybean IgE <0.10 <0.10 KU(A)/L      Comment:      Interpretation: None Detected    Tuna IgE <0.10 <0.10 KU(A)/L      Comment:      Interpretation: None Detected    [place], Food IgE <0.10 <0.10 KU(A)/L      Comment:      Interpretation: None Detected    Wheat IgE <0.10 <0.10 KU(A)/L      Comment:      Interpretation: None Detected    Narrative    ImmunoCAP Specific IgE Blood Test Quantitative Scoring  <0.10 kU(A)/L        Absent/undetectable  0.10-0.69 kU(A)/L    Low  0.70-3.49 kU(A)/L    Moderate  3.50-17.50 kU(A)/L   High  >17.50 kU(A)/L      Very High  Please note: In general, low IgE antibody levels indicate a low probability of  clinical disease, whereas high antibody levels to an allergen show good correlation with clinical disease.   Magnesium   Result Value Ref Range    Magnesium 2.1 1.6 - 2.3 mg/dL   Phosphorus   Result Value Ref Range    Phosphorus 4.3 2.5 - 4.5 mg/dL       If you have any questions or concerns, please call the clinic at the number listed above.       Sincerely,      Zachary Devries MD

## 2022-11-03 NOTE — PROGRESS NOTES
indigestion  BCP    Bloating   Nausea   Assessment & Plan     Abdominal pain, generalized     constipation l bloating   discussed gerd, vs IBS      [unfilled] guidsance discussed    Ordering of each unique test  Prescription drug management  30 minutes spent on the date of the encounter doing chart review, history and exam, documentation and further activities per the note        CONSULTATION/REFERRAL to  GI    Return in about 1 month (around 12/3/2022) for via VIRTUAL VISIT, recheck.    Zachary Devries MD  [facility] SHAKA Martel is a 22 year old, presenting for the following health issues:  Nutrition Counseling  hx of abdominal pain bloating     Labs  Colonoscopy  Neg     Has GI appointment coming up   Has had constipation in the past/ concerned about nutritional deficiency      History of Present Illness       Reason for visit:  Nutrional testing  Symptom onset:  More than a month    She eats 2-3 servings of fruits and vegetables daily.She consumes 0 sweetened beverage(s) daily.She exercises with enough effort to increase her heart rate 30 to 60 minutes per day.  She exercises with enough effort to increase her heart rate 4 days per week.   She is taking medications regularly.             Review of Systems   Constitutional, HEENT, cardiovascular, pulmonary, gi and gu systems are negative, except as otherwise noted.      Objective    LMP 10/03/2022   There is no height or weight on file to calculate BMI.  Physical Exam   GENERAL: healthy, alert and no distress  NECK: no adenopathy, no asymmetry, masses, or scars and thyroid normal to palpation  RESP: lungs clear to auscultation - no rales, rhonchi or wheezes  CV: regular rate and rhythm, normal S1 S2, no S3 or S4, no murmur, click or rub, no peripheral edema and peripheral pulses strong  ABDOMEN: soft, nontender, no hepatosplenomegaly, no masses and bowel sounds normal  MS: no gross musculoskeletal defects noted, no  edema

## 2022-11-04 LAB
CA-I BLD-MCNC: 5 MG/DL (ref 4.4–5.2)
DEPRECATED CALCIDIOL+CALCIFEROL SERPL-MC: 19 UG/L (ref 20–75)
IRON SATN MFR SERPL: 37 % (ref 15–46)
IRON SERPL-MCNC: 122 UG/DL (ref 35–180)
TIBC SERPL-MCNC: 327 UG/DL (ref 240–430)
VIT B12 SERPL-MCNC: 532 PG/ML (ref 232–1245)

## 2022-11-05 LAB
ALMOND IGE QN: <0.1 KU(A)/L
CASHEW NUT IGE QN: <0.1 KU(A)/L
CODFISH IGE QN: <0.1 KU(A)/L
COW MILK IGE QN: <0.1 KU(A)/L
EGG WHITE IGE QN: <0.1 KU(A)/L
HAZELNUT IGE QN: <0.1 KU(A)/L
IGE SERPL-ACNC: 11 KU/L (ref 0–114)
PEANUT IGE QN: <0.1 KU(A)/L
SALMON IGE QN: <0.1 KU(A)/L
SCALLOP IGE QN: <0.1 KU(A)/L
SESAME SEED IGE QN: <0.1 KU(A)/L
SHRIMP IGE QN: <0.1 KU(A)/L
SOYBEAN IGE QN: <0.1 KU(A)/L
TUNA IGE QN: <0.1 KU(A)/L
WALNUT IGE QN: <0.1 KU(A)/L
WHEAT IGE QN: <0.1 KU(A)/L

## 2022-11-09 LAB — PYRIDOXAL PHOS SERPL-SCNC: 31.7 NMOL/L

## 2022-11-15 ENCOUNTER — OFFICE VISIT (OUTPATIENT)
Dept: FAMILY MEDICINE | Facility: CLINIC | Age: 22
End: 2022-11-15
Payer: COMMERCIAL

## 2022-11-15 VITALS
DIASTOLIC BLOOD PRESSURE: 76 MMHG | SYSTOLIC BLOOD PRESSURE: 123 MMHG | HEART RATE: 60 BPM | WEIGHT: 100.2 LBS | BODY MASS INDEX: 17.75 KG/M2 | HEIGHT: 63 IN | RESPIRATION RATE: 16 BRPM | TEMPERATURE: 98.6 F | OXYGEN SATURATION: 100 %

## 2022-11-15 DIAGNOSIS — Q82.5 BIRTHMARK: Primary | ICD-10-CM

## 2022-11-15 PROCEDURE — 99213 OFFICE O/P EST LOW 20 MIN: CPT | Performed by: PHYSICIAN ASSISTANT

## 2022-11-15 ASSESSMENT — PAIN SCALES - GENERAL: PAINLEVEL: NO PAIN (0)

## 2022-11-15 NOTE — PROGRESS NOTES
"  Assessment & Plan     Birthmark  Reassurance given  Dr. Olivia Kumar also examined today.   Appears to be a faint hemangioma birthmark.                    Return in about 1 year (around 11/15/2023) for with primary provider.    Kristen M. Kehr, PA-C [facility]    Rebeka Martel is a 22 year old, presenting for the following health issues:  Derm Problem      HPI     Concern - Birthmark   Onset: all of life  Description: kar on her lower back she would like evaluated   Intensity: mild  Progression of Symptoms:  same  Accompanying Signs & Symptoms: gets inflamed in the sun   Previous history of similar problem:   Precipitating factors:        Worsened by: none  Alleviating factors:        Improved by: none  Therapies tried and outcome: None        Review of Systems   Constitutional, HEENT, cardiovascular, pulmonary, GI, , musculoskeletal, neuro, skin, endocrine and psych systems are negative, except as otherwise noted.      Objective    /76   Pulse 60   Temp 98.6  F (37  C) (Tympanic)   Resp 16   Ht 1.59 m (5' 2.6\")   Wt 45.5 kg (100 lb 3.2 oz)   LMP 10/03/2022   SpO2 100%   BMI 17.98 kg/m    Body mass index is 17.98 kg/m .  Physical Exam   GENERAL: healthy, alert and no distress  SKIN: faint blotchy light pink hemangioma on her low back / midline  PSYCH: mentation appears normal, affect normal/bright                    "

## 2022-11-17 ENCOUNTER — TELEPHONE (OUTPATIENT)
Dept: BEHAVIORAL HEALTH | Facility: CLINIC | Age: 22
End: 2022-11-17

## 2022-11-17 NOTE — TELEPHONE ENCOUNTER
Patient have a video appointment today at 8am with [facility]. Writer placed a call this morning to check in. Unable to get a hold of patient. Writer left a voicemail with writer's call back number.

## 2022-11-21 ENCOUNTER — HEALTH MAINTENANCE LETTER (OUTPATIENT)
Age: 22
End: 2022-11-21

## 2022-11-29 ENCOUNTER — TELEPHONE (OUTPATIENT)
Dept: BEHAVIORAL HEALTH | Facility: CLINIC | Age: 22
End: 2022-11-29

## 2022-11-29 NOTE — TELEPHONE ENCOUNTER
Patient have a video appointment today at 8am. Writer placed a call this morning to check in. Unable to get a hold of patient. Writer left a voicemail with writer's call back number. Write included in voicemail that latest time to check in is 8:15am. If patient miss appointment, patient can reschedule with Intake. Writer also left Intake's number in voicemail.

## 2022-12-01 ENCOUNTER — VIRTUAL VISIT (OUTPATIENT)
Dept: FAMILY MEDICINE | Facility: CLINIC | Age: 22
End: 2022-12-01
Payer: COMMERCIAL

## 2022-12-01 DIAGNOSIS — K59.00 CONSTIPATION, UNSPECIFIED CONSTIPATION TYPE: ICD-10-CM

## 2022-12-01 DIAGNOSIS — G89.29 CHRONIC PELVIC PAIN IN FEMALE: Primary | ICD-10-CM

## 2022-12-01 DIAGNOSIS — R19.8 IRREGULAR BOWEL HABITS: ICD-10-CM

## 2022-12-01 DIAGNOSIS — R10.2 CHRONIC PELVIC PAIN IN FEMALE: Primary | ICD-10-CM

## 2022-12-01 PROCEDURE — 99215 OFFICE O/P EST HI 40 MIN: CPT | Mod: 95 | Performed by: FAMILY MEDICINE

## 2022-12-01 ASSESSMENT — ANXIETY QUESTIONNAIRES
3. WORRYING TOO MUCH ABOUT DIFFERENT THINGS: MORE THAN HALF THE DAYS
1. FEELING NERVOUS, ANXIOUS, OR ON EDGE: MORE THAN HALF THE DAYS
GAD7 TOTAL SCORE: 13
5. BEING SO RESTLESS THAT IT IS HARD TO SIT STILL: SEVERAL DAYS
7. FEELING AFRAID AS IF SOMETHING AWFUL MIGHT HAPPEN: SEVERAL DAYS
6. BECOMING EASILY ANNOYED OR IRRITABLE: MORE THAN HALF THE DAYS
2. NOT BEING ABLE TO STOP OR CONTROL WORRYING: MORE THAN HALF THE DAYS
4. TROUBLE RELAXING: NEARLY EVERY DAY
8. IF YOU CHECKED OFF ANY PROBLEMS, HOW DIFFICULT HAVE THESE MADE IT FOR YOU TO DO YOUR WORK, TAKE CARE OF THINGS AT HOME, OR GET ALONG WITH OTHER PEOPLE?: VERY DIFFICULT
IF YOU CHECKED OFF ANY PROBLEMS ON THIS QUESTIONNAIRE, HOW DIFFICULT HAVE THESE PROBLEMS MADE IT FOR YOU TO DO YOUR WORK, TAKE CARE OF THINGS AT HOME, OR GET ALONG WITH OTHER PEOPLE: VERY DIFFICULT
7. FEELING AFRAID AS IF SOMETHING AWFUL MIGHT HAPPEN: SEVERAL DAYS
GAD7 TOTAL SCORE: 13

## 2022-12-01 ASSESSMENT — PATIENT HEALTH QUESTIONNAIRE - PHQ9
SUM OF ALL RESPONSES TO PHQ QUESTIONS 1-9: 9
10. IF YOU CHECKED OFF ANY PROBLEMS, HOW DIFFICULT HAVE THESE PROBLEMS MADE IT FOR YOU TO DO YOUR WORK, TAKE CARE OF THINGS AT HOME, OR GET ALONG WITH OTHER PEOPLE: VERY DIFFICULT
SUM OF ALL RESPONSES TO PHQ QUESTIONS 1-9: 9

## 2022-12-01 NOTE — PROGRESS NOTES
Tahmina is a 22 year old who is being evaluated via a billable video visit.      How would you like to obtain your AVS? MyChart  If the video visit is dropped, the invitation should be resent by: Text to cell phone: [phone]  Will anyone else be joining your video visit? No        Assessment & Plan     Chronic pelvic pain in female  Follow through [facility] pelvic [facility]  She would like something sooner than March but I explained that this is a very specialized area and that's probably as good as it gets    Also explained that given symptoms and gyn concern for endometriosis, laparoscopy is really the only definitive test  And might well explain a lot of her symptoms since they seem to get worse jewell-menstral    Could see another GYN for a second opinion, if so she needs to have records transferred to us, since nothing in Epic at this time    Constipation, unspecified constipation type  Irregular bowel habits  rectal ulceration that looked mechanical, c/w chronic severe constipation  rec continue miralax as the only thing likely to make much of a difference there  Rectal manometry, with pelvic floor center, etc  I think the laparoscopy is more important see above    No follow-ups on file.    42 minutes spent on the date of the encounter doing chart review, review of outside records, review of test results, patient visit and documentation     Arnie Heller MD  [facility]   Tahmina is a 22 year old, presenting for the following health issues:  Abdominal Pain and Anxiety (Depression, OCD, is going to therapy weekly for the past 2 years, is helping some )        History of Present Illness       Reason for visit:  GI and pelvic floor symptoms and talking about possible causes and other tests I could do such as an MRI    She eats 2-3 servings of fruits and vegetables daily.She consumes 0 sweetened beverage(s) daily.She  "exercises with enough effort to increase her heart rate 30 to 60 minutes per day.  She exercises with enough effort to increase her heart rate 4 days per week.   She is taking medications regularly.    Today's PHQ-9         PHQ-9 Total Score: 9    PHQ-9 Q9 Thoughts of better off dead/self-harm past 2 weeks :   Not at all    How difficult have these problems made it for you to do your work, take care of things at home, or get along with other people: Very difficult  Today's JAMA-7 Score: 13       Constipation  Onset/Duration: indigestion, bloating, gas and constipation, for many years, much worse 2 1/2 years, wondering if any testing can be done  Description:  Frequency of bowel movements: self stimulated daily   Consistency of stool: soft   Progression of Symptoms: constant  Accompanying signs and symptoms:    Abdominal pain: sometimes, usually around her menses    Rectal pain: YES   Blood in stool: very rarely    Nausea/Vomiting: rarely nausea   Weight loss or gain: No  History:   Similar problems in past: YES- was seen by OB and GI doctor, has had a colonoscopy, ultrasound, vaginal exam, blood testing all testing came back normal except low levels of vitamin D   History of abdominal surgery: No  Chronic laxative use: No  New medications: No  Precipitating or alleviating factors: worse around her menses  Therapies tried and outcome: Pelvic floor therapy, laxatives in the past     Has had a lower endoscopy. There was some mechanical ulceration    GI doc plan is to see someone in colorectal    OB - symptoms get worse near period, suggested laparoscopy to look for endometriosis?      Review of Systems   Constitutional, HEENT, cardiovascular, pulmonary, gi and gu systems are negative, except as otherwise noted.      Objective    Vitals - Patient Reported  Weight (Patient Reported): 46.7 kg (103 lb)  Height (Patient Reported): 159 cm (5' 2.6\")  BMI (Based on Pt Reported Ht/Wt): 18.48  Pain Score: No Pain " (0)      Vitals:  No vitals were obtained today due to virtual visit.    Physical Exam   GENERAL: Healthy, alert and no distress  EYES: Eyes grossly normal to inspection.  No discharge or erythema, or obvious scleral/conjunctival abnormalities.  RESP: No audible wheeze, cough, or visible cyanosis.  No visible retractions or increased work of breathing.    SKIN: Visible skin clear. No significant rash, abnormal pigmentation or lesions.  NEURO: Cranial nerves grossly intact.  Mentation and speech appropriate for age.  PSYCH: Mentation appears normal, affect anxious, judgement and insight intact, normal speech and appearance well-groomed.    Colonoscopy reviewed, only rectal ulceration that looked mechanical, c/w chronic severe constipation      Labs reviewed, nothing really jumps out          Video-Visit Details    Joined the call at 12/1/2022, 4:42:35 pm.  Left the call at 12/1/2022, 5:10:35 pm.  You were on the call for 28 minutes .    Originating Location (pt. Location): home        Distant Location (provider location):  On-site    Platform used for Video Visit: Subhash

## 2022-12-25 ENCOUNTER — NURSE TRIAGE (OUTPATIENT)
Dept: NURSING | Facility: CLINIC | Age: 22
End: 2022-12-25

## 2022-12-25 NOTE — TELEPHONE ENCOUNTER
Father calling- no consent on file- patient currently sleeping- has a question about how to get scheduled for a sweat test- advised that an MD would need to order this test if it was necessary and that writer was unable to access any schedules or review any information without patients consent to do so. Father verbalizes understanding. Will have patient follow up with their MD about sweat test.     Agnes Rose RN TIARA 12/25/2022 8:43 AM w      Reason for Disposition    General information question, no triage required and triager able to answer question    Additional Information    Negative: [1] Caller is not with the adult (patient) AND [2] reporting urgent symptoms    Negative: Lab result questions    Negative: Medication questions    Negative: Caller can't be reached by phone    Negative: Caller has already spoken to PCP or another triager    Negative: RN needs further essential information from caller in order to complete triage    Negative: Requesting regular office appointment    Negative: [1] Caller requesting NON-URGENT health information AND [2] PCP's office is the best resource    Negative: Health Information question, no triage required and triager able to answer question    Protocols used: INFORMATION ONLY CALL - NO TRIAGE-A-

## 2023-01-20 ENCOUNTER — OFFICE VISIT (OUTPATIENT)
Dept: PULMONOLOGY | Facility: CLINIC | Age: 23
End: 2023-01-20
Payer: COMMERCIAL

## 2023-01-20 ENCOUNTER — LAB (OUTPATIENT)
Dept: PULMONOLOGY | Facility: CLINIC | Age: 23
End: 2023-01-20
Payer: COMMERCIAL

## 2023-01-20 DIAGNOSIS — K85.90 ACUTE PANCREATITIS: Primary | ICD-10-CM

## 2023-01-20 DIAGNOSIS — Z87.09 HISTORY OF FREQUENT URI: ICD-10-CM

## 2023-01-20 DIAGNOSIS — Z13.228 SCREENING FOR CYSTIC FIBROSIS: ICD-10-CM

## 2023-01-20 DIAGNOSIS — K85.90 ACUTE PANCREATITIS, UNSPECIFIED COMPLICATION STATUS, UNSPECIFIED PANCREATITIS TYPE: Primary | ICD-10-CM

## 2023-01-20 DIAGNOSIS — R88.8 ABNORMAL SWEAT CHORIDE TEST WITHOUT DIAGNOSIS OF CYSTIC FIBROSIS: ICD-10-CM

## 2023-01-20 DIAGNOSIS — G89.29 CHRONIC ABDOMINAL PAIN: ICD-10-CM

## 2023-01-20 DIAGNOSIS — K59.00 CONSTIPATION, UNSPECIFIED CONSTIPATION TYPE: ICD-10-CM

## 2023-01-20 DIAGNOSIS — R10.9 CHRONIC ABDOMINAL PAIN: ICD-10-CM

## 2023-01-20 DIAGNOSIS — Z71.83 ENCOUNTER FOR NONPROCREATIVE GENETIC COUNSELING: ICD-10-CM

## 2023-01-20 LAB — SWEAT CHLORIDE: NORMAL

## 2023-01-20 PROCEDURE — 89230 COLLECT SWEAT FOR TEST: CPT

## 2023-01-20 PROCEDURE — 36415 COLL VENOUS BLD VENIPUNCTURE: CPT | Performed by: GENETIC COUNSELOR, MS

## 2023-01-20 PROCEDURE — 96040 HC GENETIC COUNSELING, EACH 30 MINUTES: CPT | Performed by: GENETIC COUNSELOR, MS

## 2023-01-20 NOTE — LETTER
1/20/2023      RE: Tahmina Muhammad  [address]  [facility] [address]       Presenting information:   Tahmina Muhammad is a 22 year old female with a history of acute pancreatitis. She was seen in-person today at the [facility] for evaluation and sweat chloride test, after reported referral from Dr. Olivier Guan ([facility]). I saw Tahmina in-person today to obtain a personal and family history, discuss the genetics of cystic fibrosis/CFTR-related disorders, her sweat chloride results, and the option of genetic testing.      Personal History:   Tahmina has a history of acute pancreatitis, with diagnosis in 6/2022 at [facility], with elevated lipase (638, ref range  IU/L). Tahmina also notes at least two similar pain episodes suspicious for pancreatitis where she did not present to the ER (one a few years ago and one a few weeks after 6/2022). Tahmina notes drinking alcohol and Red Bull prior to the 6/2022 episode. Major alcohol usage, smoking, and other known triggering factors for pancreatitis were otherwise denied. Tahmina notes she currently has alcohol ~1-2 times a week. Tahmina also notes chronic abdominal pain, bloating, and constipation since she can remember, but worse the last 2-3 years. She has had a past colonoscopy which showed rectal ulcers and hemorrhoids per past clinic notes. Tahmina notes a possible diagnosis of pelvic floor dysfunction causing discomfort, with worse symptoms during her periods.     Outside of GI symptoms, Tahmina reports she gets sick often (with several rounds of antibiotics this year), croup as a child, a few recent sinus and ear infections, and that she has always been on the smaller side. Additional history includes mental health diagnoses.     Birth defects, intellectual disabilities/developmental delays, and lung diseases/hospitalizations were denied.     Family History:   A three generation pedigree was obtained today and sent to be scanned into the  [facility]. The family history was significant for the following:    Tahmina has no children at this time.    Tahmina has one brother, who is ~17 years old and healthy.    Tahmina's mother (50's) has a history of fibroids and heart palpitations. She has one sister and two brothers, who have no major health concerns. Of Tahmina's maternal first cousins, one has a history of testicular cancer.    Tahmina's maternal grandmother has a history of her gallbladder removed, fibroids, and a spinal cyst (non-cancerous) that was removed. Tahmina's maternal grandfather passed in his 60's from a heart attack. He had a history of smoking.    Tahmina's father (50's) has a history of getting sick often, but is otherwise healthy. He has one sister and two brothers. They and their children have no major health concerns.    Tahmina's paternal grandmother passed in her 60's-70's from brain cancer. Tahmina's paternal grandfather has a history of hip/knee issues, but is otherwise healthy.    The family history was otherwise negative for individuals with cystic fibrosis, known genetic conditions, young passing, pancreatic cancer, infertility, and pancreatitis.     Tahmnia is of [place] ancestry. Consanguinity was denied.     Background:   Genes are long stretches of DNA that are responsible for how our bodies look and how our bodies work. We all have two copies of every gene, one inherited from our mother and one inherited from our father. When there is a change, called a mutation, in a gene it can cause it to not do its job correctly which can cause the signs and symptoms of a genetic condition.     There are many known factors for pancreatitis, including non-genetic (for example drinking, smoking, autoimmune, etc) and genetic (underlying genetic changes that increase risk). There are also likely unknown genetic and/or non-genetic factors that influence pancreatitis, which we may know more about with time. Some individuals with pancreatitis likely have a  multifactorial inheritance (many factors contributing to their history) instead of just one or two large factors.     Of large genetic factors, one of the genes that is causative and associated with high risk for pancreatitis is PRSS1. This gene can also cause an increased risk for pancreatic cancer.  Many other genes, such as SPINK1, CTRC, CPA1, CaSR, and others are thought to be moderate risk genes and have an association with pancreatitis. Additionally idiopathic pancreatitis has been found to be associated with changes in the gene for cystic fibrosis (CFTR) as well. These genes have differences in terms of risk for pancreatitis and/or if there are other associated symptoms, and inheritance patterns in a family. We discussed the complexity of genetic testing for hereditary pancreatitis and that further discussion is needed when any genetic testing results return.     Specifically, we discussed that classical cystic fibrosis causes a person to produce thick, sticky mucus due to an imbalance of salt and water in and out of the cells. This affects the lungs, sinuses, digestive system, and male reproductive system, among other areas of the body. Children who are diagnosed with classical cystic fibrosis need respiratory therapy several times a day, and may need to take pills to help their body to digest food. However, it is important to remember that there can be great variation in the symptoms that a person may have. There is great variability in symptoms in individuals with CF, where some individuals have very mild symptoms or are only diagnosed in adulthood as a result of a male infertility work-up (with CAVD) or pancreatitis, while others have significant symptoms as infants and are diagnosed soon after birth. Most of the information online about CFTR is about classic CF/severe presentations.     Genetic testing, sweat chloride testing, and someone's personal history all can help assess where someone may be in  this spectrum, though we are not able to predict exactly what someone's presentation may look like or what, if any, symptoms they may develop at this time.     We next reviewed the genetics of CFTR-related disorders including cystic fibrosis (CF). This condition is caused by mutations in a gene called CFTR.  CFTR-related disorders are inherited in an autosomal recessive pattern.  This means that to be affected an individual must inherit a mutation in both copies of the CFTR gene (one from each parent). These exact mutations can be more severe or mild, which can affect presentation/severity for an individual.      Individuals with just one mutation in the CFTR gene (aka one copy of the gene that does not work) are said to be carriers.  Carriers do not have cystic fibrosis but can have an affected child if their partner is also a carrier or has a CFTR-related disorder themselves.  When two parents are carriers, then with each pregnancy together there is a 25% chance to have a child with cystic fibrosis, a 50% chance to have a child who is an unaffected carrier, and a 25% chance to have a child who is unaffected and not a carrier. No one has control over which copy they pass on to their child since it is a random process. These exact chances change if one parent personally has a CFTR disorder. Approximately 1 in 25 Caucasians are carriers of cystic fibrosis.       While most people with two CFTR mutations have classic cystic fibrosis, CFTR mutations can also cause milder CFTR disorders. The diagnosis of cystic fibrosis is made by a sweat chloride test of >60 mmol/L (normal range <30 mmol/L), and/or the presence of two severe CF-causing mutations in the CFTR gene.  A diagnosis of a CFTR-related disorder is technically made including when an individual has at least one of their two mutations is not classified as severe CF-causing or genetic testing is inconclusive AND their sweat chloride test is borderline.     Sweat  Test Result:  Because of Tahmina's GI history + other symptoms, she was recommended to have a sweat chloride test. The sweat test is the gold standard test used to diagnose an individual with cystic fibrosis. Brief information about sweat chloride testing and possible results (positive >60 mmol/L, borderline 30-59, and normal <30) were reviewed today.     Tahmina sweat chloride test returned today at 33 and 33 mmol/L Chloride. This is considered in a borderline result, and may be associated with a CFTR-related disorder. Because of this, we would recommend CFTR genetic testing at this time for Tahmina. We discussed that if this genetic testing found two classic CF mutations (unlikely), this would be consistent with a diagnosis of cystic fibrosis, despite the borderline sweat result. If genetic testing identifies two mutations where one is milder, this would be consistent with a diagnosis of a milder CFTR-related disorder. Conversely, normal CFTR genetic testing would make it unlikely that Tahmina has CF/a CFTR-related disorder. Therefore, genetic testing will hopefully give more conclusive information about her correct diagnosis in the context of a just barely borderline sweat chloride test.     I did note repeat sweat chloride test would also be an option, and may be recommended pending genetic testing results.     Tahmina was in agreement with the plan of holding off on seeing a CF adult provider until her genetic testing returned and any diagnosis is more clear, especially given that her sweat test was just barely abnormal.    In summary, I emphasized that these sweat test results mean it is unlikely that Tahmina has classic CF, but genetic testing will hopefully give more information about if she has a milder related disorder (CFTR-related disorder) or not, and help us best manage her. Next steps will be discussed and planned pending genetic testing results.      Genetic Testing:  We discussed options of proceeding with  CFTR gene genetic testing only, or a panel of 6 total genes that can be associated with increased risk for pancreatitis (CASR, CFTR, CPA1, CTRC, PRSS1, and SPINK1 genes). Genetic testing involves analyzing the relevant gene(s) for pathogenic genetic changes. After discussion of benefits/limitations, Tahmina elected to proceed with the 6 gene pancreatitis panel via [facility] for maximum information.     Possible outcomes of testing were briefly discussed, including positive aka having 2 pathogenic CFTR mutations (consistent with a diagnosis of CF or CFTR-related disorder), 1 CFTR mutation (typically consistent with carrier status), no CFTR mutations, and variants of uncertain significance.  A variant of unknown significance (VUS) is a change in the DNA sequence of a particular gene where it is not known if it causes the gene to not work correctly/could cause a condition, or if it is normal benign variation that does not affect the gene. Usually more research/evidence over time is needed to determine a VUS's significance. Similarly, for the other pancreatitis genes, positive, negative, and VUS results would also be possible.     We discussed benefits of pursuing pancreatitis genetic testing today. As a genetic factor is possible for Lyndas pancreatitis, comprehensive genetic testing will give useful information to aid in directing medical management, reproductive risks, and family member risks. First, this testing may be extremely helpful in clarifying Tahmina's diagnosis. If an underlying explanation for Lyndas pancreatis can be found, it may give more information about how to appropriate manage her or give information about what to expect. More specifically, for CFTR, identification of two CFTR mutations would be consistent diagnosis of CF or CFTR-related disorder on a genetic level. In contrast, identification of no mutations would make this less likely. Management would very likely differ in each of these  scenarios. Secondly, if someone does have CFTR disorder, genetic testing results determining one's exact CFTR mutations can also help determine possible severity based on exact mutations and eligibility for certain treatments called CFTR modulators. Thirdly, it is possible an underlying genetic cause for pancreatitis may predispose Tahmina to other health risks. Knowing about these additional health risks can help us stay ahead of her healthcare to more appropriately screen for and potentially prevent other complications. Lastly, discovering an underlying reason for Tahmina's history may help predict the chance for other family members and any future children for Tahmina to have pancreatitis and/or cystic fibrosis. Therefore, this pancreatitis genetic testing is medically necessary for Tahmina.    Limitations of genetic testing were also discussed today briefly, including that genetic testing is not perfect and is only as good as our current understanding of genes and genetics in 2023. Therefore, a negative test result does not rule out all genetic changes and causes of pancreatitis. Other follow up may be recommended, including a repeat sweat test.    Tahmina elected to proceed with genetic testing for [facility]'s 6 gene pancreatitis panel, which includes CFTR. Consent for genetic testing was obtained and sent to be scanned into her chart. Blood was drawn today for testing. Mark [employer] will conduct a benefits investigation, and Tahmina will be contacted by [employer] directly with benefits investigation information and billing options (insurance vs self pay) if estimated OOP is over $100. Results from there will take approximately 2-3 weeks. I will call Tahmina at that time. Follow-up will depend on her genetic testing results.     Lab results may be automatically released via [facility].  Department protocol is to hold genetic testing results until we have reviewed them. We will then contact the family directly to disclose the  results and ensure they receive a copy of the report. This protocol was reviewed with Tahmina, who was in agreement to hold the results for genetics review and direct contact.       It was a pleasure to meet with Tahmina today. She had no additional questions at this time. Contact information was shared for any future questions or concerns that arise.     Plan:   1. Tahmina's sweat test today was borderline. This may be consistent with a diagnosis of a CFTR-related disorder.  2. [facility]'s 6 gene pancreatitis panel (includes the CFTR gene) was consented for today for more information about Tahmina diagnosis and recommended management. Blood was drawn and sent to the lab.  3. Genetic testing results will return in 2-3 weeks. I will call Tahmina at that time.  4. Follow up pending results. Additional genetic counseling will be needed at that time to discuss implications for Tahmina and family.  5. Contact information was provided should any questions arise.     Jaki Coy MS, [facility]  Genetic Counselor  Division of Genetics and Metabolism  [facility]   Phone: [phone]  Pager: [phone]        Approx time spent: 45 minutes  CC: PCP; Dr. Olivier Guan ([facility]); Tahmina

## 2023-01-26 NOTE — PROGRESS NOTES
Presenting information:   Tahmina Muhammad is a 22 year old female with a history of acute pancreatitis. She was seen in-person today at the [facility] for evaluation and sweat chloride test, after reported referral from Dr. Olivier Guan ([facility]). I saw Tahmina in-person today to obtain a personal and family history, discuss the genetics of cystic fibrosis/CFTR-related disorders, her sweat chloride results, and the option of genetic testing.      Personal History:   Tahmina has a history of acute pancreatitis, with diagnosis in 6/2022 at [facility], with elevated lipase (638, ref range  IU/L). Tahmina also notes at least two similar pain episodes suspicious for pancreatitis where she did not present to the ER (one a few years ago and one a few weeks after 6/2022). Tahmina notes drinking alcohol and Red Bull prior to the 6/2022 episode. Major alcohol usage, smoking, and other known triggering factors for pancreatitis were otherwise denied. Tahmina notes she currently has alcohol ~1-2 times a week. Tahmina also notes chronic abdominal pain, bloating, and constipation since she can remember, but worse the last 2-3 years. She has had a past colonoscopy which showed rectal ulcers and hemorrhoids per past clinic notes. Tahmina notes a possible diagnosis of pelvic floor dysfunction causing discomfort, with worse symptoms during her periods.     Outside of GI symptoms, Tahmina reports she gets sick often (with several rounds of antibiotics this year), croup as a child, a few recent sinus and ear infections, and that she has always been on the smaller side. Additional history includes mental health diagnoses.     Birth defects, intellectual disabilities/developmental delays, and lung diseases/hospitalizations were denied.     Family History:   A three generation pedigree was obtained today and sent to be scanned into the EMR. The family history was significant for the following:    Tahmina has no  children at this time.    Tahmina has one brother, who is ~17 years old and healthy.    Tahmina's mother (50's) has a history of fibroids and heart palpitations. She has one sister and two brothers, who have no major health concerns. Of Tahmina's maternal first cousins, one has a history of testicular cancer.    Tahmina's maternal grandmother has a history of her gallbladder removed, fibroids, and a spinal cyst (non-cancerous) that was removed. Tahmina's maternal grandfather passed in his 60's from a heart attack. He had a history of smoking.    Tahmina's father (50's) has a history of getting sick often, but is otherwise healthy. He has one sister and two brothers. They and their children have no major health concerns.    Tahmina's paternal grandmother passed in her 60's-70's from brain cancer. Tahmina's paternal grandfather has a history of hip/knee issues, but is otherwise healthy.    The family history was otherwise negative for individuals with cystic fibrosis, known genetic conditions, young passing, pancreatic cancer, infertility, and pancreatitis.     Tahmina is of Ma[place] ancestry. Consanguinity was denied.     Background:   Genes are long stretches of DNA that are responsible for how our bodies look and how our bodies work. We all have two copies of every gene, one inherited from our mother and one inherited from our father. When there is a change, called a mutation, in a gene it can cause it to not do its job correctly which can cause the signs and symptoms of a genetic condition.     There are many known factors for pancreatitis, including non-genetic (for example drinking, smoking, autoimmune, etc) and genetic (underlying genetic changes that increase risk). There are also likely unknown genetic and/or non-genetic factors that influence pancreatitis, which we may know more about with time. Some individuals with pancreatitis likely have a multifactorial inheritance (many factors contributing to their history) instead  of just one or two large factors.     Of large genetic factors, one of the genes that is causative and associated with high risk for pancreatitis is PRSS1. This gene can also cause an increased risk for pancreatic cancer.  Many other genes, such as SPINK1, CTRC, CPA1, CaSR, and others are thought to be moderate risk genes and have an association with pancreatitis. Additionally idiopathic pancreatitis has been found to be associated with changes in the gene for cystic fibrosis (CFTR) as well. These genes have differences in terms of risk for pancreatitis and/or if there are other associated symptoms, and inheritance patterns in a family. We discussed the complexity of genetic testing for hereditary pancreatitis and that further discussion is needed when any genetic testing results return.     Specifically, we discussed that classical cystic fibrosis causes a person to produce thick, sticky mucus due to an imbalance of salt and water in and out of the cells. This affects the lungs, sinuses, digestive system, and male reproductive system, among other areas of the body. Children who are diagnosed with classical cystic fibrosis need respiratory therapy several times a day, and may need to take pills to help their body to digest food. However, it is important to remember that there can be great variation in the symptoms that a person may have. There is great variability in symptoms in individuals with CF, where some individuals have very mild symptoms or are only diagnosed in adulthood as a result of a male infertility work-up (with CAVD) or pancreatitis, while others have significant symptoms as infants and are diagnosed soon after birth. Most of the information online about CFTR is about classic CF/severe presentations.     Genetic testing, sweat chloride testing, and someone's personal history all can help assess where someone may be in this spectrum, though we are not able to predict exactly what someone's  presentation may look like or what, if any, symptoms they may develop at this time.     We next reviewed the genetics of CFTR-related disorders including cystic fibrosis (CF). This condition is caused by mutations in a gene called CFTR.  CFTR-related disorders are inherited in an autosomal recessive pattern.  This means that to be affected an individual must inherit a mutation in both copies of the CFTR gene (one from each parent). These exact mutations can be more severe or mild, which can affect presentation/severity for an individual.      Individuals with just one mutation in the CFTR gene (aka one copy of the gene that does not work) are said to be carriers.  Carriers do not have cystic fibrosis but can have an affected child if their partner is also a carrier or has a CFTR-related disorder themselves.  When two parents are carriers, then with each pregnancy together there is a 25% chance to have a child with cystic fibrosis, a 50% chance to have a child who is an unaffected carrier, and a 25% chance to have a child who is unaffected and not a carrier. No one has control over which copy they pass on to their child since it is a random process. These exact chances change if one parent personally has a CFTR disorder. Approximately 1 in 25 Caucasians are carriers of cystic fibrosis.       While most people with two CFTR mutations have classic cystic fibrosis, CFTR mutations can also cause milder CFTR disorders. The diagnosis of cystic fibrosis is made by a sweat chloride test of >60 mmol/L (normal range <30 mmol/L), and/or the presence of two severe CF-causing mutations in the CFTR gene.  A diagnosis of a CFTR-related disorder is technically made including when an individual has at least one of their two mutations is not classified as severe CF-causing or genetic testing is inconclusive AND their sweat chloride test is borderline.     Sweat Test Result:  Because of Tahmina's GI history + other symptoms, she was  recommended to have a sweat chloride test. The sweat test is the gold standard test used to diagnose an individual with cystic fibrosis. Brief information about sweat chloride testing and possible results (positive >60 mmol/L, borderline 30-59, and normal <30) were reviewed today.     Tahmina sweat chloride test returned today at 33 and 33 mmol/L Chloride. This is considered in a borderline result, and may be associated with a CFTR-related disorder. Because of this, we would recommend CFTR genetic testing at this time for Tahmina. We discussed that if this genetic testing found two classic CF mutations (unlikely), this would be consistent with a diagnosis of cystic fibrosis, despite the borderline sweat result. If genetic testing identifies two mutations where one is milder, this would be consistent with a diagnosis of a milder CFTR-related disorder. Conversely, normal CFTR genetic testing would make it unlikely that Tahmina has CF/a CFTR-related disorder. Therefore, genetic testing will hopefully give more conclusive information about her correct diagnosis in the context of a just barely borderline sweat chloride test.     I did note repeat sweat chloride test would also be an option, and may be recommended pending genetic testing results.     Tahmina was in agreement with the plan of holding off on seeing a CF adult provider until her genetic testing returned and any diagnosis is more clear, especially given that her sweat test was just barely abnormal.    In summary, I emphasized that these sweat test results mean it is unlikely that Tahmina has classic CF, but genetic testing will hopefully give more information about if she has a milder related disorder (CFTR-related disorder) or not, and help us best manage her. Next steps will be discussed and planned pending genetic testing results.      Genetic Testing:  We discussed options of proceeding with CFTR gene genetic testing only, or a panel of 6 total genes that can be  associated with increased risk for pancreatitis (CASR, CFTR, CPA1, CTRC, PRSS1, and SPINK1 genes). Genetic testing involves analyzing the relevant gene(s) for pathogenic genetic changes. After discussion of benefits/limitations, Tahmina elected to proceed with the 6 gene pancreatitis panel via [facility] for maximum information.     Possible outcomes of testing were briefly discussed, including positive aka having 2 pathogenic CFTR mutations (consistent with a diagnosis of CF or CFTR-related disorder), 1 CFTR mutation (typically consistent with carrier status), no CFTR mutations, and variants of uncertain significance.  A variant of unknown significance (VUS) is a change in the DNA sequence of a particular gene where it is not known if it causes the gene to not work correctly/could cause a condition, or if it is normal benign variation that does not affect the gene. Usually more research/evidence over time is needed to determine a VUS's significance. Similarly, for the other pancreatitis genes, positive, negative, and VUS results would also be possible.     We discussed benefits of pursuing pancreatitis genetic testing today. As a genetic factor is possible for Lyndas pancreatitis, comprehensive genetic testing will give useful information to aid in directing medical management, reproductive risks, and family member risks. First, this testing may be extremely helpful in clarifying Tahmina's diagnosis. If an underlying explanation for Tahmina's pancreatis can be found, it may give more information about how to appropriate manage her or give information about what to expect. More specifically, for CFTR, identification of two CFTR mutations would be consistent diagnosis of CF or CFTR-related disorder on a genetic level. In contrast, identification of no mutations would make this less likely. Management would very likely differ in each of these scenarios. Secondly, if someone does have CFTR disorder, genetic testing results  determining one's exact CFTR mutations can also help determine possible severity based on exact mutations and eligibility for certain treatments called CFTR modulators. Thirdly, it is possible an underlying genetic cause for pancreatitis may predispose Tahmina to other health risks. Knowing about these additional health risks can help us stay ahead of her healthcare to more appropriately screen for and potentially prevent other complications. Lastly, discovering an underlying reason for Tahmina's history may help predict the chance for other family members and any future children for Tahmina to have pancreatitis and/or cystic fibrosis. Therefore, this pancreatitis genetic testing is medically necessary for Tahmina.    Limitations of genetic testing were also discussed today briefly, including that genetic testing is not perfect and is only as good as our current understanding of genes and genetics in 2023. Therefore, a negative test result does not rule out all genetic changes and causes of pancreatitis. Other follow up may be recommended, including a repeat sweat test.    Tahmina elected to proceed with genetic testing for [facility]'s 6 gene pancreatitis panel, which includes CFTR. Consent for genetic testing was obtained and sent to be scanned into her chart. Blood was drawn today for testing. [employer] will conduct a benefits investigation, and Tahmina will be contacted by [employer] directly with benefits investigation information and billing options (insurance vs self pay) if estimated OOP is over $100. Results from there will take approximately 2-3 weeks. I will call Tahmina at that time. Follow-up will depend on her genetic testing results.     Lab results may be automatically released via [employer]  Department protocol is to hold genetic testing results until we have reviewed them. We will then contact the family directly to disclose the results and ensure they receive a copy of the report. This protocol was reviewed with  Tahmina, who was in agreement to hold the results for genetics review and direct contact.       It was a pleasure to meet with Tahmina today. She had no additional questions at this time. Contact information was shared for any future questions or concerns that arise.     Plan:   1. Tahmina's sweat test today was borderline. This may be consistent with a diagnosis of a CFTR-related disorder.  2. [facility]'s 6 gene pancreatitis panel (includes the CFTR gene) was consented for today for more information about Tahmina diagnosis and recommended management. Blood was drawn and sent to the lab.  3. Genetic testing results will return in 2-3 weeks. I will call Tahmina at that time.  4. Follow up pending results. Additional genetic counseling will be needed at that time to discuss implications for Tahmina and family.  5. Contact information was provided should any questions arise.     Jaki Coy MS, [facility]  Genetic Counselor  Division of Genetics and Metabolism  [facility]   Phone: [phone]  Pager: [phone]        Approx time spent: 45 minutes  CC: PCP; Dr. Olivier Guan ([facility]); Tahmina

## 2023-02-02 ENCOUNTER — TELEPHONE (OUTPATIENT)
Dept: FAMILY MEDICINE | Facility: CLINIC | Age: 23
End: 2023-02-02
Payer: COMMERCIAL

## 2023-02-02 NOTE — TELEPHONE ENCOUNTER
Patient Quality Outreach    Patient is due for the following:   Cervical Cancer Screening - PAP Needed  Physical Preventive Adult Physical    Next Steps:   Schedule a Adult Preventative    Type of outreach:    Sent [employer] message.    Next Steps:  Reach out within 90 days via Letter.    Max number of attempts reached: No. Will try again in 90 days if patient still on fail list.    Questions for provider review:    None     SACHI Durant LPN

## 2023-02-06 ENCOUNTER — TELEPHONE (OUTPATIENT)
Dept: CONSULT | Facility: CLINIC | Age: 23
End: 2023-02-06
Payer: COMMERCIAL

## 2023-02-06 LAB — SCANNED LAB RESULT: NORMAL

## 2023-02-06 NOTE — TELEPHONE ENCOUNTER
On behalf of my colleague Jaki Coy I contacted Tahmina to disclose and discuss the results of her recent genetic testing for pancreatitis.  This has returned negative (normal) with no mutations identified in the analyzed genes, including CFTR.  This essentially rules out a CFTR-related disorder as the answer for Tahmina's pancreatitis, despite her borderline sweat chloride test.  This also very significantly reduces the chance that one of the other analyzed genes is responsible for Tahmina's history.  This result therefore reduces but does not eliminate the possibility of a strong genetic risk factor being responsible for her pancreatitis.  Instead, Tahmina's pancreatitis is more likely associated with multiple smaller genetic and environmental factors that each have a small but cumulative effect.  Tahmina expressed understanding and had no additional questions at this time.       Gabby Costa MS, [facility]  Genetic Counselor  [facility]  [facility]

## 2023-03-14 ENCOUNTER — TRANSCRIBE ORDERS (OUTPATIENT)
Dept: GASTROENTEROLOGY | Facility: CLINIC | Age: 23
End: 2023-03-14
Payer: COMMERCIAL

## 2023-03-14 ENCOUNTER — TRANSCRIBE ORDERS (OUTPATIENT)
Dept: OTHER | Age: 23
End: 2023-03-14

## 2023-03-14 DIAGNOSIS — K52.9 COLITIS: Primary | ICD-10-CM

## 2023-07-25 ENCOUNTER — VIRTUAL VISIT (OUTPATIENT)
Dept: FAMILY MEDICINE | Facility: CLINIC | Age: 23
End: 2023-07-25
Payer: COMMERCIAL

## 2023-07-25 DIAGNOSIS — F41.1 GAD (GENERALIZED ANXIETY DISORDER): ICD-10-CM

## 2023-07-25 DIAGNOSIS — F32.A DEPRESSION, UNSPECIFIED DEPRESSION TYPE: ICD-10-CM

## 2023-07-25 DIAGNOSIS — F42.9 OBSESSIVE-COMPULSIVE DISORDER, UNSPECIFIED TYPE: Primary | ICD-10-CM

## 2023-07-25 DIAGNOSIS — F90.9 ATTENTION DEFICIT HYPERACTIVITY DISORDER (ADHD), UNSPECIFIED ADHD TYPE: ICD-10-CM

## 2023-07-25 PROCEDURE — 99215 OFFICE O/P EST HI 40 MIN: CPT | Mod: 95 | Performed by: FAMILY MEDICINE

## 2023-07-25 PROCEDURE — 96127 BRIEF EMOTIONAL/BEHAV ASSMT: CPT | Performed by: FAMILY MEDICINE

## 2023-07-25 RX ORDER — FLUOXETINE 10 MG/1
CAPSULE ORAL
Qty: 14 CAPSULE | Refills: 1 | Status: SHIPPED | OUTPATIENT
Start: 2023-07-25 | End: 2023-11-14

## 2023-07-25 ASSESSMENT — ANXIETY QUESTIONNAIRES
GAD7 TOTAL SCORE: 13
2. NOT BEING ABLE TO STOP OR CONTROL WORRYING: MORE THAN HALF THE DAYS
4. TROUBLE RELAXING: NEARLY EVERY DAY
GAD7 TOTAL SCORE: 13
3. WORRYING TOO MUCH ABOUT DIFFERENT THINGS: MORE THAN HALF THE DAYS
7. FEELING AFRAID AS IF SOMETHING AWFUL MIGHT HAPPEN: SEVERAL DAYS
5. BEING SO RESTLESS THAT IT IS HARD TO SIT STILL: SEVERAL DAYS
IF YOU CHECKED OFF ANY PROBLEMS ON THIS QUESTIONNAIRE, HOW DIFFICULT HAVE THESE PROBLEMS MADE IT FOR YOU TO DO YOUR WORK, TAKE CARE OF THINGS AT HOME, OR GET ALONG WITH OTHER PEOPLE: VERY DIFFICULT
1. FEELING NERVOUS, ANXIOUS, OR ON EDGE: MORE THAN HALF THE DAYS
6. BECOMING EASILY ANNOYED OR IRRITABLE: MORE THAN HALF THE DAYS

## 2023-07-25 ASSESSMENT — PATIENT HEALTH QUESTIONNAIRE - PHQ9
10. IF YOU CHECKED OFF ANY PROBLEMS, HOW DIFFICULT HAVE THESE PROBLEMS MADE IT FOR YOU TO DO YOUR WORK, TAKE CARE OF THINGS AT HOME, OR GET ALONG WITH OTHER PEOPLE: VERY DIFFICULT
SUM OF ALL RESPONSES TO PHQ QUESTIONS 1-9: 15
SUM OF ALL RESPONSES TO PHQ QUESTIONS 1-9: 15
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
## 2023-09-17 ENCOUNTER — HEALTH MAINTENANCE LETTER (OUTPATIENT)
Age: 23
End: 2023-09-17

## 2023-11-14 ENCOUNTER — OFFICE VISIT (OUTPATIENT)
Dept: FAMILY MEDICINE | Facility: CLINIC | Age: 23
End: 2023-11-14
Payer: COMMERCIAL

## 2023-11-14 VITALS
RESPIRATION RATE: 12 BRPM | WEIGHT: 100.6 LBS | HEART RATE: 59 BPM | TEMPERATURE: 97.2 F | HEIGHT: 63 IN | BODY MASS INDEX: 17.82 KG/M2 | SYSTOLIC BLOOD PRESSURE: 122 MMHG | DIASTOLIC BLOOD PRESSURE: 84 MMHG | OXYGEN SATURATION: 100 %

## 2023-11-14 DIAGNOSIS — F39 MOOD DISORDER (H): Primary | ICD-10-CM

## 2023-11-14 PROCEDURE — 99214 OFFICE O/P EST MOD 30 MIN: CPT | Performed by: FAMILY MEDICINE

## 2023-11-14 RX ORDER — FLUOXETINE 10 MG/1
CAPSULE ORAL
Qty: 134 CAPSULE | Refills: 0 | Status: SHIPPED | OUTPATIENT
Start: 2023-11-14 | End: 2024-06-03

## 2023-11-14 ASSESSMENT — PATIENT HEALTH QUESTIONNAIRE - PHQ9
SUM OF ALL RESPONSES TO PHQ QUESTIONS 1-9: 24
SUM OF ALL RESPONSES TO PHQ QUESTIONS 1-9: 24
10. IF YOU CHECKED OFF ANY PROBLEMS, HOW DIFFICULT HAVE THESE PROBLEMS MADE IT FOR YOU TO DO YOUR WORK, TAKE CARE OF THINGS AT HOME, OR GET ALONG WITH OTHER PEOPLE: EXTREMELY DIFFICULT

## 2023-11-14 ASSESSMENT — ANXIETY QUESTIONNAIRES
7. FEELING AFRAID AS IF SOMETHING AWFUL MIGHT HAPPEN: MORE THAN HALF THE DAYS
IF YOU CHECKED OFF ANY PROBLEMS ON THIS QUESTIONNAIRE, HOW DIFFICULT HAVE THESE PROBLEMS MADE IT FOR YOU TO DO YOUR WORK, TAKE CARE OF THINGS AT HOME, OR GET ALONG WITH OTHER PEOPLE: EXTREMELY DIFFICULT
2. NOT BEING ABLE TO STOP OR CONTROL WORRYING: MORE THAN HALF THE DAYS
1. FEELING NERVOUS, ANXIOUS, OR ON EDGE: MORE THAN HALF THE DAYS
GAD7 TOTAL SCORE: 18
3. WORRYING TOO MUCH ABOUT DIFFERENT THINGS: NEARLY EVERY DAY
6. BECOMING EASILY ANNOYED OR IRRITABLE: NEARLY EVERY DAY
5. BEING SO RESTLESS THAT IT IS HARD TO SIT STILL: NEARLY EVERY DAY
GAD7 TOTAL SCORE: 18
4. TROUBLE RELAXING: NEARLY EVERY DAY

## 2023-11-14 ASSESSMENT — PAIN SCALES - GENERAL: PAINLEVEL: NO PAIN (0)

## 2023-11-14 NOTE — PROGRESS NOTES
Assessment & Plan     Mood disorder (H24)    - FLUoxetine (PROZAC) 10 MG capsule; Take 1 capsule (10 mg) by mouth daily for 14 days, THEN 2 capsules (20 mg) daily for 60 days.      30 minutes spent by me on the date of the encounter doing chart review, history and exam, documentation and further activities per the note       Depression Screening Follow Up        11/14/2023     7:05 AM   PHQ   PHQ-9 Total Score 24   Q9: Thoughts of better off dead/self-harm past 2 weeks More than half the days   F/U: Thoughts of suicide or self-harm Yes   F/U: Self harm-plan No   F/U: Self-harm action No   F/U: Safety concerns No       Cathi Yoder DO  [facility] [place]    Rebeka Martel is a 23 year old, presenting for the following health issues:  Chronic Disease Management        11/14/2023     7:10 AM   Additional Questions   Roomed by Diane COHEN   Accompanied by self         11/14/2023     7:10 AM   Patient Reported Additional Medications   Patient reports taking the following new medications none       History of Present Illness       Mental Health Follow-up:  Patient presents to follow-up on Depression & Anxiety.Patient's depression since last visit has been:  Worse  The patient is not having other symptoms associated with depression.  Patient's anxiety since last visit has been:  Worse  The patient is not having other symptoms associated with anxiety.  Any significant life events: health concerns  Patient is feeling anxious or having panic attacks.  Patient has no concerns about alcohol or drug use.    She eats 2-3 servings of fruits and vegetables daily.She consumes 0 sweetened beverage(s) daily.She exercises with enough effort to increase her heart rate 30 to 60 minutes per day.  She exercises with enough effort to increase her heart rate 4 days per week.   She is taking medications regularly.       She was started on prozac 3 months ago.   She only took it occasionally.  She has recently moved.  She  has 4 roommates.      She has emotional outbursts.  She has intrusive thoughts. She is not sleeping well.  She goes to bed about 2-3 am because she is up at night studying.  Then she is up for the day about 9.        Social History     Tobacco Use    Smoking status: Never     Passive exposure: Never    Smokeless tobacco: Never   Vaping Use    Vaping Use: Never used   Substance Use Topics    Alcohol use: Yes     Alcohol/week: 12.0 standard drinks of alcohol     Types: 12 Standard drinks or equivalent per week    Drug use: Yes     Types: Marijuana         12/1/2022     4:14 PM 7/25/2023    12:43 PM 11/14/2023     7:05 AM   PHQ   PHQ-9 Total Score 9 15 24   Q9: Thoughts of better off dead/self-harm past 2 weeks Not at all Several days More than half the days   F/U: Thoughts of suicide or self-harm  Yes Yes   F/U: Self harm-plan  No No   F/U: Self-harm action  No No   F/U: Safety concerns  No No         12/1/2022     4:14 PM 7/25/2023    12:44 PM 11/14/2023     7:05 AM   JAMA-7 SCORE   Total Score 13 (moderate anxiety) 13 (moderate anxiety) 18 (severe anxiety)   Total Score 13 13 18           11/14/2023     7:05 AM   Last PHQ-9   1.  Little interest or pleasure in doing things 3   2.  Feeling down, depressed, or hopeless 3   3.  Trouble falling or staying asleep, or sleeping too much 3   4.  Feeling tired or having little energy 3   5.  Poor appetite or overeating 3   6.  Feeling bad about yourself 2   7.  Trouble concentrating 3   8.  Moving slowly or restless 2   Q9: Thoughts of better off dead/self-harm past 2 weeks 2   PHQ-9 Total Score 24   In the past two weeks have you had thoughts of suicide or self harm? Yes   Do you have concerns about your personal safety or the safety of others? No   In the past 2 weeks have you thought about a plan or had intention to harm yourself? No   In the past 2 weeks have you acted on these thoughts in any way? No         11/14/2023     7:05 AM   JAMA-7    1. Feeling nervous, anxious,  "or on edge 2   2. Not being able to stop or control worrying 2   3. Worrying too much about different things 3   4. Trouble relaxing 3   5. Being so restless that it is hard to sit still 3   6. Becoming easily annoyed or irritable 3   7. Feeling afraid, as if something awful might happen 2   JAMA-7 Total Score 18   If you checked any problems, how difficult have they made it for you to do your work, take care of things at home, or get along with other people? Extremely difficult                  No data to display                Follow Up Actions Taken  Crisis resource information provided in the After Visit Summary  Patient to follow up with PCP.  Clinic staff to schedule appointment if able.     Discussed the following ways the patient can remain in a safe environment:  be around others  Suicide Assessment Five-step Evaluation and Treatment (SAFE-T)              Review of Systems   Constitutional, HEENT, cardiovascular, pulmonary, gi and gu systems are negative, except as otherwise noted.      Objective    /84 (BP Location: Right arm, Patient Position: Sitting, Cuff Size: Adult Regular)   Pulse 59   Temp 97.2  F (36.2  C) (Oral)   Resp 12   Ht 1.6 m (5' 3\")   Wt 45.6 kg (100 lb 9.6 oz)   LMP 11/07/2023   SpO2 100%   BMI 17.82 kg/m    Body mass index is 17.82 kg/m .  Physical Exam   GENERAL: healthy, alert and no distress  PSYCH: mentation appears normal, affect normal/bright                    "

## 2023-12-07 ENCOUNTER — MYC MEDICAL ADVICE (OUTPATIENT)
Dept: FAMILY MEDICINE | Facility: CLINIC | Age: 23
End: 2023-12-07
Payer: COMMERCIAL

## 2023-12-07 DIAGNOSIS — F33.1 MODERATE EPISODE OF RECURRENT MAJOR DEPRESSIVE DISORDER (H): Primary | ICD-10-CM

## 2023-12-07 DIAGNOSIS — F41.1 GAD (GENERALIZED ANXIETY DISORDER): ICD-10-CM

## 2023-12-08 PROBLEM — F33.1 MODERATE EPISODE OF RECURRENT MAJOR DEPRESSIVE DISORDER (H): Status: ACTIVE | Noted: 2023-12-08

## 2023-12-08 PROBLEM — F41.1 GAD (GENERALIZED ANXIETY DISORDER): Status: ACTIVE | Noted: 2023-12-08

## 2023-12-08 PROBLEM — R19.8 IRREGULAR BOWEL HABITS: Status: RESOLVED | Noted: 2021-07-15 | Resolved: 2023-12-08

## 2023-12-08 PROBLEM — Z28.20 VACCINE REFUSED BY PATIENT: Status: RESOLVED | Noted: 2022-10-17 | Resolved: 2023-12-08

## 2023-12-08 PROBLEM — F39 MOOD DISORDER (H): Status: RESOLVED | Noted: 2021-07-15 | Resolved: 2023-12-08

## 2024-02-27 ENCOUNTER — MYC MEDICAL ADVICE (OUTPATIENT)
Dept: FAMILY MEDICINE | Facility: CLINIC | Age: 24
End: 2024-02-27
Payer: COMMERCIAL

## 2024-02-27 NOTE — TELEPHONE ENCOUNTER
Patient Quality Outreach    Patient is due for the following:   Cervical Cancer Screening - PAP Needed  Physical Preventive Adult Physical    Next Steps:   Schedule a Adult Preventative with pap smear    Type of outreach:    Sent [employer] message.      Questions for provider review:    None           Diane Mchugh CMA

## 2024-06-03 ENCOUNTER — OFFICE VISIT (OUTPATIENT)
Dept: FAMILY MEDICINE | Facility: CLINIC | Age: 24
End: 2024-06-03
Payer: COMMERCIAL

## 2024-06-03 VITALS
WEIGHT: 100.2 LBS | TEMPERATURE: 97.1 F | HEART RATE: 60 BPM | HEIGHT: 63 IN | RESPIRATION RATE: 18 BRPM | DIASTOLIC BLOOD PRESSURE: 72 MMHG | SYSTOLIC BLOOD PRESSURE: 116 MMHG | OXYGEN SATURATION: 99 % | BODY MASS INDEX: 17.75 KG/M2

## 2024-06-03 DIAGNOSIS — F33.1 MODERATE EPISODE OF RECURRENT MAJOR DEPRESSIVE DISORDER (H): ICD-10-CM

## 2024-06-03 DIAGNOSIS — H93.13 TINNITUS, BILATERAL: Primary | ICD-10-CM

## 2024-06-03 DIAGNOSIS — F41.1 GAD (GENERALIZED ANXIETY DISORDER): ICD-10-CM

## 2024-06-03 PROCEDURE — 99214 OFFICE O/P EST MOD 30 MIN: CPT | Performed by: PHYSICIAN ASSISTANT

## 2024-06-03 PROCEDURE — V5008 HEARING SCREENING: HCPCS | Performed by: PHYSICIAN ASSISTANT

## 2024-06-03 ASSESSMENT — ENCOUNTER SYMPTOMS
COUGH: 0
SHORTNESS OF BREATH: 0
FEVER: 0

## 2024-06-03 ASSESSMENT — PAIN SCALES - GENERAL: PAINLEVEL: NO PAIN (0)

## 2024-06-03 ASSESSMENT — PATIENT HEALTH QUESTIONNAIRE - PHQ9
10. IF YOU CHECKED OFF ANY PROBLEMS, HOW DIFFICULT HAVE THESE PROBLEMS MADE IT FOR YOU TO DO YOUR WORK, TAKE CARE OF THINGS AT HOME, OR GET ALONG WITH OTHER PEOPLE: EXTREMELY DIFFICULT
SUM OF ALL RESPONSES TO PHQ QUESTIONS 1-9: 18
SUM OF ALL RESPONSES TO PHQ QUESTIONS 1-9: 18

## 2024-06-03 NOTE — PATIENT INSTRUCTIONS
Your ear exam is normal.  We are completing hearing screening today and have also placed a referral to audiology for more in-depth hearing evaluation.  Scheduling will call you to arrange your appointment.    For further management of depression/anxiety and possible OCD, I placed a referral to psychiatry as requested.  Scheduling will call you to arrange your appointment.  Please continue with counseling in the meantime.  If you are ever in need of talking to someone urgently/emergently, please use crisis information below.  If you ever plans for self-harm, please call 911.    Reach out with questions or concerns.    In an emergency--call 911  Don't leave the person alone. Anyone who is at imminent risk of suicide needs psychiatric services right away. The person must be constantly watched, and never left out of sight. Call 911 or a 24-hour suicide crisis hotline. You can search for this online. You can also take the person to the nearest hospital emergency room.     Don't keep it a secret and don't wait  Call a mental health clinic or a licensed mental health professional in your area right away. This may be a psychiatrist, clinical psychologist, psychiatric or licensed clinical , marriage and family counselor, or clergy. If you don't know how to contact such professionals and there is an immediate risk, call 911. Tell them you need help for a person who is thinking about suicide.     Resources  National Suicide Prevention Gglqjtfi648-647-2487 (767-257-ONYA)www.suicidepreventionlifeline.org  National Suicide Vpuyojp498-150-0443 (800-SUICIDE)  National [place] of Mental Jcekur799-638-8263mam.nimh.nih.gov  National [place] on Mental Ifnxsrk446-200-0277kut.jarrett.org  Mental Health Ozbdihz143-476-3093wni.nmha.org

## 2024-06-03 NOTE — PROGRESS NOTES
Assessment & Plan     Tinnitus, bilateral  Patient is a 23-year-old female who presents to clinic due to intermittent tinnitus ongoing for many years.  Symptoms worsened while in college classes this past year and are now improving again.  She denies any related headache, dizziness, lightheadedness.  Patient notes significant noise exposure as she grew up hunting without ear protection.  Vital signs normal.  Physical exam without acute abnormalities.  No cerumen impaction present.  Will complete hearing screening and recommended following up with audiology for more in-depth evaluation.  Referral placed.  - HEARING SCREENING  - Adult Audiology  Referral; Future    Moderate episode of recurrent major depressive disorder (H)  JAMA (generalized anxiety disorder)  PHQ-9 alert.  Patient notes ongoing depression/anxiety.  She was previously on fluoxetine, but discontinued this medication months ago.  She has been compliant with counseling.  Patient wishes to speak with psychiatry as she is concerned for OCD.  She notes she met with psychiatry in the past who thought she might have this disorder.  Discussed options for medication management.  Patient wishes to defer until psychiatry follow-up.  Referral placed.  Patient able to contract for safety.  Crisis information discussed and provided.  - Adult Mental Health  Referral; Future      34 minutes spent on chart review, patient care, documentation      See Patient Instructions    Rebeka Martel is a 23 year old, presenting for the following health issues:  Ear Problem      6/3/2024     2:48 PM   Additional Questions   Roomed by Melissa BACH MA   Accompanied by No one         6/3/2024     2:48 PM   Patient Reported Additional Medications   Patient reports taking the following new medications None     History of Present Illness       Reason for visit:  Ringing and occasional pain in ears  Symptom onset:  More than a month  Symptoms include:  Ringing in  ears  Symptom intensity:  Mild  Symptom progression:  Staying the same  Had these symptoms before:  Yes  Has tried/received treatment for these symptoms:  No  What makes it worse:  No  What makes it better:  No    She eats 2-3 servings of fruits and vegetables daily.She consumes 1 sweetened beverage(s) daily.She exercises with enough effort to increase her heart rate 20 to 29 minutes per day.  She exercises with enough effort to increase her heart rate 3 or less days per week.   She is taking medications regularly.      PATIENT HEALTH QUESTIONNAIRE-9 (PHQ - 9)    Over the last 2 weeks, how often have you been bothered by any of the following problems?    1. Little interest or pleasure in doing things -  More than half the days   2. Feeling down, depressed, or hopeless -  More than half the days   3. Trouble falling or staying asleep, or sleeping too much - More than half the days   4. Feeling tired or having little energy -  More than half the days   5. Poor appetite or overeating -  More than half the days   6. Feeling bad about yourself - or that you are a failure or have let yourself or your family down -  Nearly every day   7. Trouble concentrating on things, such as reading the newspaper or watching television - More than half the days   8. Moving or speaking so slowly that other people could have noticed? Or the opposite - being so fidgety or restless that you have been moving around a lot more than usual More than half the days   9. Thoughts that you would be better off dead or of hurting  yourself in some way Several days   Total Score: 18     Patient notes she wants to see a psychiatrist. She stopped prescribed Fluoxetine as she was looking into other medication options as well as other therapy options. Patient is currently in talk therapy.    Patient notes intermittent ear pain in the past and has had to have wax removed. Symptoms worsened this year in college. Ringing is mainly right ear, but was lasting  "longer. Symptoms are improving now. No change in hearing. No related balance/dizziness/lightheadedness. Patient grew up around a lot of gun noises and did not wear hearing protection.     Review of Systems   Constitutional:  Negative for fever.   HENT:  Positive for ear pain. Negative for congestion and hearing loss.    Respiratory:  Negative for cough and shortness of breath.            Objective    /72   Pulse 60   Temp 97.1  F (36.2  C) (Temporal)   Resp 18   Ht 1.6 m (5' 3\")   Wt 45.5 kg (100 lb 3.2 oz)   LMP 05/14/2024 (Approximate)   SpO2 99%   Breastfeeding No   BMI 17.75 kg/m    Body mass index is 17.75 kg/m .  Physical Exam  Vitals and nursing note reviewed.   Constitutional:       General: She is not in acute distress.     Appearance: Normal appearance.   HENT:      Head: Normocephalic and atraumatic.      Right Ear: Tympanic membrane, ear canal and external ear normal.      Left Ear: Tympanic membrane, ear canal and external ear normal.   Eyes:      Extraocular Movements: Extraocular movements intact.      Pupils: Pupils are equal, round, and reactive to light.   Cardiovascular:      Rate and Rhythm: Normal rate and regular rhythm.      Heart sounds: Normal heart sounds.   Pulmonary:      Effort: Pulmonary effort is normal.      Breath sounds: Normal breath sounds.   Musculoskeletal:         General: Normal range of motion.      Cervical back: Normal range of motion.   Lymphadenopathy:      Cervical: No cervical adenopathy.   Skin:     General: Skin is warm and dry.   Neurological:      General: No focal deficit present.      Mental Status: She is alert.   Psychiatric:         Mood and Affect: Mood normal.         Behavior: Behavior normal.         Thought Content: Thought content normal.         Judgment: Judgment normal.                    Signed Electronically by: Na Chowdhury PA-C    "

## 2024-06-17 ENCOUNTER — OFFICE VISIT (OUTPATIENT)
Dept: AUDIOLOGY | Facility: CLINIC | Age: 24
End: 2024-06-17
Payer: COMMERCIAL

## 2024-06-17 DIAGNOSIS — H93.13 TINNITUS, BILATERAL: ICD-10-CM

## 2024-06-17 DIAGNOSIS — H93.11 TINNITUS OF RIGHT EAR: Primary | ICD-10-CM

## 2024-06-17 PROCEDURE — 92550 TYMPANOMETRY & REFLEX THRESH: CPT | Performed by: AUDIOLOGIST

## 2024-06-17 PROCEDURE — 92557 COMPREHENSIVE HEARING TEST: CPT | Performed by: AUDIOLOGIST

## 2024-06-17 NOTE — PROGRESS NOTES
AUDIOLOGY REPORT    SUBJECTIVE:  Tahmina Muhammad is a 23 year old female who was seen in the Audiology Clinic at the [facility] for audiologic evaluation, referred by Na Chowdhury PA-C. The patient reports longstanding intermittent tinnitus in her right ear that is sometimes a high pitched ring and other times a low pitched hum. The patient also reports occasional dizziness. The patient denies otalgia, aural fullness, and otorrhea.     OBJECTIVE:  Abuse Screening:  Do you feel unsafe at home or work/school? No  Do you feel threatened by someone? No  Does anyone try to keep you from having contact with others, or doing things outside of your home? No  Physical signs of abuse present? No     Fall Risk Screen:  1. Have you fallen two or more times in the past year? No  2. Have you fallen and had an injury in the past year? No    Otoscopic exam indicates ears are clear of cerumen bilaterally     Pure Tone Thresholds assessed using conventional audiometry with good  reliability from 250-8000 Hz bilaterally using circumaural headphones     RIGHT:  normal hearing    LEFT:    normal hearing    Tympanogram:    RIGHT: normal eardrum mobility    LEFT:   normal eardrum mobility    Reflexes (reported by stimulus ear):  RIGHT: Ipsilateral is present at normal levels  RIGHT: Contralateral is absent at frequencies tested  LEFT:   Ipsilateral is elevated  LEFT:   Contralateral is elevated      Speech Reception Threshold:    RIGHT: 5 dB HL    LEFT:   5 dB HL  Word Recognition Score:     RIGHT: 100% at 45 dB HL using NU-6 recorded word list.    LEFT:   100% at 45 dB HL using NU-6 recorded word list.      ASSESSMENT:     ICD-10-CM    1. Tinnitus of right ear  H93.11 Cmprhn Audiometry Thrshld Eval & Speech Recog (29594)     Tymps / Reflex   (50575)      2. Tinnitus, bilateral  H93.13 Adult Audiology  Referral          Today s results were discussed with the patient in detail.     PLAN: It is recommended  that the patient be evaluated by ENT.  Please call this clinic with questions regarding these results or recommendations.        Kirti Garibay.  Doctor of Audiology  [place] License # [id number]

## 2024-06-24 ENCOUNTER — OFFICE VISIT (OUTPATIENT)
Dept: FAMILY MEDICINE | Facility: CLINIC | Age: 24
End: 2024-06-24
Payer: COMMERCIAL

## 2024-06-24 VITALS
RESPIRATION RATE: 20 BRPM | BODY MASS INDEX: 17.72 KG/M2 | SYSTOLIC BLOOD PRESSURE: 116 MMHG | HEIGHT: 63 IN | DIASTOLIC BLOOD PRESSURE: 76 MMHG | OXYGEN SATURATION: 100 % | TEMPERATURE: 98.5 F | WEIGHT: 100 LBS | HEART RATE: 61 BPM

## 2024-06-24 DIAGNOSIS — R10.84 ABDOMINAL PAIN, GENERALIZED: Primary | ICD-10-CM

## 2024-06-24 DIAGNOSIS — R11.0 NAUSEA: ICD-10-CM

## 2024-06-24 DIAGNOSIS — R10.13 EPIGASTRIC PAIN: ICD-10-CM

## 2024-06-24 DIAGNOSIS — R14.0 BLOATING: ICD-10-CM

## 2024-06-24 PROCEDURE — 99213 OFFICE O/P EST LOW 20 MIN: CPT | Performed by: PHYSICIAN ASSISTANT

## 2024-06-24 NOTE — PROGRESS NOTES
"  Assessment & Plan     Abdominal pain, generalized  I have reviewed patient's chart in detail. She is not having symptoms today, but wanted to discuss how she should move forward and to see if she should be checked for gallbladder pathology. Discussed that with all the testing she has had done, they have excluded gallstones. She could do a HIDA scan, but I would leave that to GI at this point to order since she does have a relationship with them. In addition I reiterated that the only way she will know she has endometriosis is through surgery, which can alleviate symptoms for a while. And that endometriosis is not often cured, but controlled with birth control when not desiring pregnancy. I have, therefore, returned her to GYN and GI to continue their evaluation and treatment and did not order further imaging or labs.     Nausea    Epigastric pain    Bloating    22 minutes in face to face discussion regarding these issues.      Rebeka Martel is a 23 year old, presenting for the following health issues:  Abdominal Pain (W/ bloating )      6/24/2024     2:37 PM   Additional Questions   Roomed by Judith   Accompanied by self         6/24/2024     2:37 PM   Patient Reported Additional Medications   Patient reports taking the following new medications n/a     Tahmina is a pleasant 23 year old female who presents to clinic for evaluation of GI issues. She reports that she has had digestive issues her entire life but in the past 3-4 years, she feels after eating \"heavier\" foods (like breads, pastas) abdominal pain and bloating. Greasy foods also set it off at times. Sometimes, she she feels a generalized sense of bloating or heartburn. She has some issues with both constipation and diarrhea. It is difficult for her to eat a meal without bloating unless it is small and bland, easily digestible. Her dad wanted her to come in to make sure she was not having a gallbladder problem. She has been tested for multiple other " "things, including celiac, and has been negative for them all. No fevers, chills, vomiting, pain that lasts several hours. She has seen a GI specialist. She has had a colonoscopy a couple years ago that was normal. She says they mentioned \"ulcers\" but does not recall that anything was done about this. She was referred to a nutritionist, a pelvic floor physical therapist (for hypertonic pelvic floor issues) because it was thought that perhaps the pelvic floor was causing the abdominal problems. GYN thinks the abdomen and the pelvic issues are related. They theorize that she might have endometriosis but she is really trying to avoid an exploratory surgery and she is also not too excited about trying DEPO or other birth control.     ROS: no fevers, chills, occasional nausea with certain foods, vomits rarely, abd pain is usually epigastric or generalized.       Had pelvic US at [facility] and an MRI by GI doctor and has had an abdominal CT. Has issues with acute pancreatitis (not sure what caused this, had one Vodka Redbull drink, went to [employer] and had chicken fingers). Has had another couple flares of those same sympotoms but cannot point to anything specific that caused it. She has this pain daily, not as severe as when she had pancreatitis. Has not seen GI or GYN in a while.         History of Present Illness       Reason for visit:  Gi issues    She eats 2-3 servings of fruits and vegetables daily.She consumes 0 sweetened beverage(s) daily.She exercises with enough effort to increase her heart rate 30 to 60 minutes per day.  She exercises with enough effort to increase her heart rate 4 days per week.   She is taking medications regularly.        Review of Systems  Constitutional, neuro, ENT, endocrine, pulmonary, cardiac, gastrointestinal, genitourinary, musculoskeletal, integument and psychiatric systems are negative, except as otherwise noted.      Objective    LMP 05/14/2024 (Approximate)   There is no " height or weight on file to calculate BMI.  Physical Exam  Vitals reviewed.   Constitutional:       Appearance: Normal appearance.   Pulmonary:      Effort: Pulmonary effort is normal.   Neurological:      General: No focal deficit present.      Mental Status: She is alert and oriented to person, place, and time.   Psychiatric:         Mood and Affect: Mood normal.         Thought Content: Thought content normal.         Judgment: Judgment normal.          Signed Electronically by: MILES SANTANA PA-C

## 2024-06-26 PROBLEM — R11.0 NAUSEA: Status: ACTIVE | Noted: 2024-06-26

## 2024-06-26 PROBLEM — R14.0 BLOATING: Status: ACTIVE | Noted: 2024-06-26

## 2024-06-26 PROBLEM — R10.13 EPIGASTRIC PAIN: Status: ACTIVE | Noted: 2024-06-26

## 2024-06-26 PROBLEM — R10.84 ABDOMINAL PAIN, GENERALIZED: Status: ACTIVE | Noted: 2024-06-26

## 2024-07-08 ENCOUNTER — VIRTUAL VISIT (OUTPATIENT)
Dept: PSYCHIATRY | Facility: CLINIC | Age: 24
End: 2024-07-08
Attending: NURSE PRACTITIONER
Payer: COMMERCIAL

## 2024-07-08 DIAGNOSIS — F33.1 MODERATE EPISODE OF RECURRENT MAJOR DEPRESSIVE DISORDER (H): ICD-10-CM

## 2024-07-08 DIAGNOSIS — F41.1 GAD (GENERALIZED ANXIETY DISORDER): ICD-10-CM

## 2024-07-08 PROCEDURE — G2211 COMPLEX E/M VISIT ADD ON: HCPCS | Mod: 95 | Performed by: NURSE PRACTITIONER

## 2024-07-08 PROCEDURE — 90833 PSYTX W PT W E/M 30 MIN: CPT | Mod: 95 | Performed by: NURSE PRACTITIONER

## 2024-07-08 PROCEDURE — 99204 OFFICE O/P NEW MOD 45 MIN: CPT | Mod: 95 | Performed by: NURSE PRACTITIONER

## 2024-07-08 ASSESSMENT — ANXIETY QUESTIONNAIRES
GAD7 TOTAL SCORE: 14
4. TROUBLE RELAXING: NEARLY EVERY DAY
7. FEELING AFRAID AS IF SOMETHING AWFUL MIGHT HAPPEN: SEVERAL DAYS
8. IF YOU CHECKED OFF ANY PROBLEMS, HOW DIFFICULT HAVE THESE MADE IT FOR YOU TO DO YOUR WORK, TAKE CARE OF THINGS AT HOME, OR GET ALONG WITH OTHER PEOPLE?: EXTREMELY DIFFICULT
6. BECOMING EASILY ANNOYED OR IRRITABLE: MORE THAN HALF THE DAYS
7. FEELING AFRAID AS IF SOMETHING AWFUL MIGHT HAPPEN: SEVERAL DAYS
3. WORRYING TOO MUCH ABOUT DIFFERENT THINGS: MORE THAN HALF THE DAYS
2. NOT BEING ABLE TO STOP OR CONTROL WORRYING: MORE THAN HALF THE DAYS
IF YOU CHECKED OFF ANY PROBLEMS ON THIS QUESTIONNAIRE, HOW DIFFICULT HAVE THESE PROBLEMS MADE IT FOR YOU TO DO YOUR WORK, TAKE CARE OF THINGS AT HOME, OR GET ALONG WITH OTHER PEOPLE: EXTREMELY DIFFICULT
GAD7 TOTAL SCORE: 14
GAD7 TOTAL SCORE: 14
1. FEELING NERVOUS, ANXIOUS, OR ON EDGE: MORE THAN HALF THE DAYS
5. BEING SO RESTLESS THAT IT IS HARD TO SIT STILL: MORE THAN HALF THE DAYS

## 2024-07-08 ASSESSMENT — PATIENT HEALTH QUESTIONNAIRE - PHQ9
SUM OF ALL RESPONSES TO PHQ QUESTIONS 1-9: 20
10. IF YOU CHECKED OFF ANY PROBLEMS, HOW DIFFICULT HAVE THESE PROBLEMS MADE IT FOR YOU TO DO YOUR WORK, TAKE CARE OF THINGS AT HOME, OR GET ALONG WITH OTHER PEOPLE: EXTREMELY DIFFICULT
SUM OF ALL RESPONSES TO PHQ QUESTIONS 1-9: 20

## 2024-07-08 NOTE — NURSING NOTE
Current patient location: [address] [place] [id number]    Is the patient currently in the state of [place]? YES    Visit mode:VIDEO    If the visit is dropped, the patient can be reconnected by: VIDEO VISIT: Text to cell phone:   Telephone Information:   Mobile [phone]    and VIDEO VISIT: Send to e-mail at: [email or web address]    Will anyone else be joining the visit? NO  (If patient encounters technical issues they should call [phone])    How would you like to obtain your AVS? MyChart    Are changes needed to the allergy or medication list? Pt stated no changes to allergies and Pt stated no med changes    Are refills needed on medications prescribed by this physician? NO    Reason for visit: EMMY RIVEROF

## 2024-07-08 NOTE — PROGRESS NOTES
"Virtual Visit Details    Type of service:  Video Visit     Originating Location (pt. Location): Home  Distant Location (provider location):  Off-site  Platform used for Video Visit: Well       [facility]  Psychiatry Clinic    MEDICAL DIAGNOSTIC ASSESSMENT       Tahmina Muhammad is a 23 year old female who prefers the name Tahmina & pronouns she, her.      Therapist- looking to return to saba Dawson at [facility]    PCP- No Ref-Primary, Physician    Other Providers: none    Referred by PCP for evaluation of anxiety spectrum, depression.       PREVIOUS PSYCHOTROPIC MED TRIALS:  - fluoxetine 10mg (prescribed but never trialed)    Pertinent Background: Describes her childhood as neglectful (especially with her mental and GI health) since childhood. Onset of anxiety, inattention, counting compulsions, depression in childhood. Depression and anxiety are \"constant\", obsessions are intermittent, anger increases when she's around her parents. Recognizes her Dad dealt with a lot of anger but \"this is better now\". Symptoms worsened before going to college in 2020.    Psych critical item history includes suicidal ideation.       CHIEF COMPLAINT                                                           \" Talk about treatment options, therapy more than meds. Prefer a holistic approach. An accurate diagnosis \"     HISTORY OF PRESENT ILLNESS         History was provided by patient who was a good historian.    Denies current medical and psychiatric meds including OCP.    Information gathered from patient report and chart review.    Describes her mood as \"depressed and anxious\".  - she saw Cathi Yoder in Nov 2023 for assessment   - she had a virtual psychiatry appointment in Oct 2022  - active symptoms include depression, anxiety  - talking to therapist Eunice about potential OCD, intrusive thoughts  - she is curious about potential ADHD, BPD diagnoses  - she's often dealt with anger that precedes self " "harm  - she notices \"switching of emotions, whiplash kind of feelings, the anger in relationships especially romantic ones\"  - she wonders about history of urges for disordered eating, whether this is an attempt to \"self medicate\" or comfort her self    - relief of symptoms since graduating in May   - currently working as a [employer], she's gaining experience, would like to work in cyber security  - enjoys being with her friends, creative activities like watching films, writing, reading, playing her guitar  - support from friends  - coping skills include some tactics from therapy, getting to the gym for weights, writing      RECENT PSYCHIATRIC ROS:   Depression:PHQ 20; endorses suicidal ideation without plan, without intent, depressed mood, anhedonia, low energy, insomnia, appetite changes, poor concentration /memory, feeling worthless, and psychomotor changes [slowing]  - denies current SI and none in the last couple weeks, symptoms were worst in fall 2023, SI range between active and passive, GI symptoms flare and may worsen SI, thoughts of fear of death and how her death would affect others are protective  - denies SAD over winter, might feel \"worse in summer\"  - endorses luteal phase mood (7 days before, symptoms improve by day 3 of her cycle, endorses worsening mood, heightened anxiety and SI)    Elevated: she denies increased goal directed behavior, decreased sleep need, grandiosity    Psychosis:  none  Anxiety: JAMA 14; endorses excessive worry, feeling fearful, and nervous/overwhelmed  Trauma Related:  mood dysregulation  Dysregulation:  suicidal ideation and mood dysregulation  Eating Disorder:  pattern of emotional eating vs binge eating , seeking comfort in food but may eat when its painful, craving sweets  Obsessions: changed from counting to rumination on her medical health and her sexuality (\"I know myself pretty well, thoughts doubting my sexuality\"), \"violent ones, especially with my " "anger, scared seeing them, almost convincing that that was I wanted to do. I know I don't want to act on those\". Obsessions re: her health (GI) worries, \"very aware of bodily sensations, the pain and discomfort and it's so much that I can't enjoy anything, it affects everything, can't focus on my life\"     ADVERSE SIDE EFFECTS: N/A  MEDICAL CONCERNS:  saw PCP aN Chowdhury at [facility]    APPETITE: varied, 100# at 5'3\", 17 BMI  SLEEP: trouble getting to bed early with GI issues, without structure of school, she's sleep 8-10 hours but later into the day (often 6a-2p); prefers to wake before 12p, prefers to avoid naps       RECENT SUBSTANCE USE:     Alcohol- drinking sporadically, drinks socially, might have one drink or 1-4 shots, often 0-2x weekly  Tobacco- no  Caffeine- reduced, might have 1-2 energy drinks or soda a week  Cannabis- uses socially, limits due to increased anxiety, perhaps 1x monthly  Opioids-  none          Narcan Kit-  N/A  Other illicit drugs-  experimented with [facility]    PSYCHIATRIC HISTORY                           SIB-  hit herself when she was a child, none in 10 years  Suicidal Ideation Hx- yes  Suicide Attempt- #- no, most recent- N/A    Violence/Aggression Hx-  history of lashing out verbally when very angry as a child, internalizes as an adult  Psychosis Hx- no  Eating Disorder Hx-  monitoring    Psych Hosp- #- no, most recent- N/A   Commitment- no  ECT- no  Outpatient Programs - no    SUBSTANCE USE HISTORY                        Past Use- none  Treatment- #, most recent- no  Medical Consequences- no  HIV/Hepatitis- no  Legal Consequences- no    SOCIAL and FAMILY HISTORY                       FINANCIAL SUPPORT-  working part time        FAMILY/ CHILDREN/ RELATIONSHIPS-  parents, one brother (b. 2005)       LIVING SITUATION- lives between her college house (4 roommates) and her parents      LEGAL- None    EARLY HISTORY/ EDUCATION- born and raised by  parents in [place], one younger " brother. Graduated [employer]. Graduated [employer] with major in strategic communications.    CULTURAL/ SOCIAL/ SPIRITUAL SUPPORT- identifies as spiritual but not Advent; support from friends; feeling connected to [facility] community     TRAUMA HISTORY- neglect as a child  FEELS SAFE AT HOME- Yes  FAMILY MENTAL HEALTH HISTORY-  nothing diagnosed with parents, brother    MEDICAL / SURGICAL HISTORY         A comprehensive review of systems was performed and is negative other than noted in the HPI.    Pregnant or breastfeeding- no      Contraception- no    Denies TBI/LOC; she used to hit herself in her head as a child. Denies seizures. NKDA    Patient Active Problem List   Diagnosis    Constipation, unspecified constipation type    Rectal ulcer    Hemorrhoid    Moderate episode of recurrent major depressive disorder (H)    JAMA (generalized anxiety disorder)    Abdominal pain, generalized    Epigastric pain    Nausea    Bloating      ALLERGY        Patient has no known allergies.    MEDICATIONS          No current outpatient medications on file.     VITALS         LMP 06/18/2024 (Approximate)      Pulse Readings from Last 5 Encounters:   06/24/24 61   06/03/24 60   11/14/23 59   11/15/22 60   11/03/22 59     Wt Readings from Last 5 Encounters:   06/24/24 45.4 kg (100 lb)   06/03/24 45.5 kg (100 lb 3.2 oz)   11/14/23 45.6 kg (100 lb 9.6 oz)   11/15/22 45.5 kg (100 lb 3.2 oz)   11/03/22 47.4 kg (104 lb 8 oz)     BP Readings from Last 5 Encounters:   06/24/24 116/76   06/03/24 116/72   11/14/23 122/84   11/15/22 123/76   11/03/22 114/80     MENTAL STATUS EXAM          Alertness: alert  and oriented  Appearance: adequately groomed  Behavior/Demeanor: cooperative, pleasant, and calm, with good  eye contact   Speech: normal and regular rate and rhythm  Language: no problems  Psychomotor: normal or unremarkable  Mood: depressed and anxious  Affect: restricted and appropriate; was congruent to mood; was congruent to  content  Thought Process/Associations: overinclusive   Thought Content:  Reports suicidal ideation without plan; without intent [details in Interim History];  Denies suicidal and violent ideation, delusions, preoccupations, obsessions , phobia , magical thinking, over-valued ideas, and paranoid ideation  Perception:  Reports none;  Denies auditory hallucinations, visual hallucinations, visual distortion seen as shadows , depersonalization, and derealization  Insight: fair  Judgment: adequate for safety  Cognition: does not appear grossly intact; formal cognitive testing was not done  Gait and Station:  N/A    LABS and DATA     Answers submitted by the patient for this visit:  Patient Health Questionnaire (Submitted on 7/8/2024)  If you checked off any problems, how difficult have these problems made it for you to do your work, take care of things at home, or get along with other people?: Extremely difficult  PHQ9 TOTAL SCORE: 20  JAMA-7 (Submitted on 7/8/2024)  JAMA 7 TOTAL SCORE: 14    PHQ9 TODAY =       11/14/2023     7:05 AM 6/3/2024     2:42 PM 7/8/2024    10:00 AM   PHQ   PHQ-9 Total Score 24 18 20   Q9: Thoughts of better off dead/self-harm past 2 weeks More than half the days Several days Several days   F/U: Thoughts of suicide or self-harm Yes Yes Yes   F/U: Self harm-plan No Yes No   F/U: Self-harm action No No No   F/U: Safety concerns No No No       Recent Labs   Lab Test 06/17/22  1203   CR 0.67   GFRESTIMATED >90     Recent Labs   Lab Test 06/17/22  1203   AST 20   ALT 16   ALKPHOS 82       ASSESSMENT     Today, the following items were reviewed:     [place]  was checked today:  indicates no file found .    PSYCHOTROPIC DRUG INTERACTIONS: N/A.  Drug Interaction Management: N/A    RISK STATEMENT for SAFETY     Tahmina Martel did not appear to be an imminent safety risk to self or others.    DIAGNOSES                                           Impression includes JAMA, moderate, recurrent MDD  - she is  seeking diagnostic clarity around symptoms of anxiety spectrum (JAMA,  OCD), healthy eating, BPD vs normal developmental adjustment to adulthood, intermittent inattention     PLAN            1) discussed options, risks, benefits, her preference to pursue holistic health options before medication, her connection to previous therapist and desire to reschedule, potential RACHELLE for writer to talk to therapist re: differential, potential to submit online psychiatric notes from fall 2022, paradoxical risk should she decide to pursue med options, that we will continue to meet and assess her most accurate diagnoses over time; today, she declines med trial.    2) she'd like to reach out to former therapist to reschedule, have a couple visits then call to reschedule with writer as needed  3) she will use [employer] to submit previous psych eval      RTC: as needed    Level of Medical Decision Making:   - At least 1 chronic problem that is not stable  - Engaged in prescription drug management during visit (discussed any medication benefits, side effects, alternatives, etc.)     The longitudinal plan of care for depression, anxiety spectrum was addressed during this visit. Due to the added complexity in care, I will continue to support Tahmina in the subsequent management of this condition(s) and with the ongoing continuity of care of this condition(s).    Psychiatry Individual Psychotherapy Note   Psychotherapy start time - 11:30a  Psychotherapy end time - 11:46a  Date treatment plan last reviewed with patient - 07/08/24  Subjective: This supportive psychotherapy session addressed issues related to goals of therapy and current psychosocial stressors. Patient's reaction: Contemplation in the context of mental status appropriate for ambulatory setting.    Interactive complexity indicated? No  -The need to manage maladaptive communication (related to, e.g., high anxiety, high reactivity, repeated questions, or disagreement) among  participants that complicates delivery of care  Plan: RTC in timeframe noted above  Psychotherapy services during this visit included myself and the patient.   Treatment Plan      SYMPTOMS; PROBLEMS   MEASURABLE GOALS;    FUNCTIONAL IMPROVEMENT / GAINS INTERVENTIONS DISCHARGE CRITERIA   Depression: anhedonia and appetite changes  Anxiety: excessive worry and nervous/overwhelmed   find enjoyment at least once a day, make a plan to manage 2-3 anxiety-provoking situations, be free of threats to self, and as structure and exercise appear helpful for mood, work on sleep hygiene, exercise habit  Supportive / psychodynamic marked symptom improvement       CRISIS NUMBERS:   Provided routinely in AVS     TREATMENT RISK STATEMENT:  The risks, benefits, alternatives and potential adverse effects have been discussed and are understood by the pt. The pt understands the risks of using street drugs or alcohol. There are no medical contraindications, the pt agrees to treatment with the ability to do so. The pt knows to call the clinic for any problems or to access emergency care if needed.  Medical and substance use concerns are documented above.  Psychotropic drug interaction check was done, including changes made today.    PROVIDER: YOLIS Mcnair CNP

## 2024-11-09 ENCOUNTER — HEALTH MAINTENANCE LETTER (OUTPATIENT)
Age: 24
End: 2024-11-09

## 2025-02-03 ENCOUNTER — VIRTUAL VISIT (OUTPATIENT)
Dept: PSYCHIATRY | Facility: CLINIC | Age: 25
End: 2025-02-03
Attending: NURSE PRACTITIONER
Payer: COMMERCIAL

## 2025-02-03 VITALS — WEIGHT: 102 LBS | BODY MASS INDEX: 18.07 KG/M2 | HEIGHT: 63 IN

## 2025-02-03 DIAGNOSIS — F33.1 MODERATE EPISODE OF RECURRENT MAJOR DEPRESSIVE DISORDER (H): Primary | ICD-10-CM

## 2025-02-03 DIAGNOSIS — F41.1 GAD (GENERALIZED ANXIETY DISORDER): ICD-10-CM

## 2025-02-03 ASSESSMENT — ANXIETY QUESTIONNAIRES
5. BEING SO RESTLESS THAT IT IS HARD TO SIT STILL: MORE THAN HALF THE DAYS
4. TROUBLE RELAXING: NEARLY EVERY DAY
2. NOT BEING ABLE TO STOP OR CONTROL WORRYING: NEARLY EVERY DAY
7. FEELING AFRAID AS IF SOMETHING AWFUL MIGHT HAPPEN: NEARLY EVERY DAY
6. BECOMING EASILY ANNOYED OR IRRITABLE: MORE THAN HALF THE DAYS
7. FEELING AFRAID AS IF SOMETHING AWFUL MIGHT HAPPEN: NEARLY EVERY DAY
GAD7 TOTAL SCORE: 19
8. IF YOU CHECKED OFF ANY PROBLEMS, HOW DIFFICULT HAVE THESE MADE IT FOR YOU TO DO YOUR WORK, TAKE CARE OF THINGS AT HOME, OR GET ALONG WITH OTHER PEOPLE?: EXTREMELY DIFFICULT
GAD7 TOTAL SCORE: 19
1. FEELING NERVOUS, ANXIOUS, OR ON EDGE: NEARLY EVERY DAY
3. WORRYING TOO MUCH ABOUT DIFFERENT THINGS: NEARLY EVERY DAY
IF YOU CHECKED OFF ANY PROBLEMS ON THIS QUESTIONNAIRE, HOW DIFFICULT HAVE THESE PROBLEMS MADE IT FOR YOU TO DO YOUR WORK, TAKE CARE OF THINGS AT HOME, OR GET ALONG WITH OTHER PEOPLE: EXTREMELY DIFFICULT
GAD7 TOTAL SCORE: 19

## 2025-02-03 ASSESSMENT — PATIENT HEALTH QUESTIONNAIRE - PHQ9
SUM OF ALL RESPONSES TO PHQ QUESTIONS 1-9: 26
10. IF YOU CHECKED OFF ANY PROBLEMS, HOW DIFFICULT HAVE THESE PROBLEMS MADE IT FOR YOU TO DO YOUR WORK, TAKE CARE OF THINGS AT HOME, OR GET ALONG WITH OTHER PEOPLE: EXTREMELY DIFFICULT
SUM OF ALL RESPONSES TO PHQ QUESTIONS 1-9: 26

## 2025-02-03 ASSESSMENT — PAIN SCALES - GENERAL: PAINLEVEL_OUTOF10: MODERATE PAIN (5)

## 2025-02-03 NOTE — PROGRESS NOTES
"Virtual Visit Details    Type of service:  Video Visit     Originating Location (pt. Location): Home  Distant Location (provider location):  Off-site  Platform used for Video Visit: [facility]  Psychiatry Clinic    PROGRESS NOTE       Tahmina Muhammad is a 24 year old female who prefers the name Tahmina & pronouns she, her.      Therapist- looking to return to therapist Eunice at [facility]    PCP- No Ref-Primary, Physician    Other Providers: none      PREVIOUS PSYCHOTROPIC MED TRIALS:  - fluoxetine 10mg (prescribed but never trialed)    Pertinent Background: Describes her childhood as neglectful (especially with her mental and GI health) since childhood. Onset of anxiety, inattention, counting compulsions, depression in childhood. Depression and anxiety are \"constant\", obsessions are intermittent, anger increases when she's around her parents. Recognizes her Dad dealt with a lot of anger but \"this is better now\". Symptoms worsened before going to college in 2020.    Psych critical item history includes suicidal ideation.       HISTORY OF PRESENT ILLNESS         History was provided by patient who was a good historian. Last seen for an intake on 7/08/2024 when she chose to not trial psychotropics.    Describes her mood as \"not well, a huge uptick in anxiety\".  - feeling sick with sore throat and fever on and off over the last month  - struggling \"a lot with mental things, talking to someone romantically, someone I kind of knew, reconnecting created a lot of anxiety. Anxious whenever I have any sort of romantic contact. She's been great, deals with her own mental health stuff. My depression symptoms are fairly unrelated to this. We talked about holistic route but I wonder if a med is next?\"    - active in therapy, may increase to weekly  - notices she needs space and her partner may be a bit more \"clingy\"    - applying for FT work in communications, living with her parents  - " "working as a [employer]     - enjoys being with her friends, films, writing, reading, playing her guitar  - support from friends  - coping skills include therapy skills, weights, writing    RECENT PSYCHIATRIC ROS:   Depression:PHQ 26; endorses many days of anhedonia, depression, interrupted sleep, varied appetite, trouble concentrating, feelings of failure, psychomotor slowing, several days of low energy  - denies current SI and none recently, these can feel intrusive and range between active and passive, fear of death and how her death would affect others is protective    - denies SAD over winter and might feel \"worse in summer\"    - endorses luteal phase mood (7 days before, symptoms improve by day 3 of her cycle, endorses worsening mood, heightened anxiety and SI)    Anxiety: JAMA 19; endorses excessive worry, feeling fearful, nervous, overwhelmed talking to a new partner, setting healthy boundaries  Dysregulation: suicidal ideation, urges for SIB, mood dysregulation  Obsessions: no counting, rumination on her medical health (GI) and her sexuality, monitoring violent thoughts    ADVERSE SIDE EFFECTS: N/A  MEDICAL CONCERNS: sore throat, fever, sees PCP Na Chowdhury at [facility]    APPETITE: varied, no emotional eating, eating 2-3 healthy meals a day, 102# at 5'3\", 18 BMI  SLEEP: sleeping 12a - 830a before she got sick, prefers to wake before 12p, prefers to avoid naps       RECENT SUBSTANCE USE:     Alcohol- none  Caffeine- reduced, mindful of energy drinks, soda and impact on GI system   Cannabis- none, limits due to increased anxiety    SOCIAL and FAMILY HISTORY                       FINANCIAL SUPPORT-  working part time        FAMILY/ CHILDREN/ RELATIONSHIPS-  parents, one brother (b. 2005)       LIVING SITUATION- lives between her college house (4 roommates) and her parents      LEGAL- None    EARLY HISTORY/ EDUCATION- born and raised by  parents in [place], one younger brother. Graduated " "[employer] with major in strategic communications.    CULTURAL/ SOCIAL/ SPIRITUAL SUPPORT- identifies as spiritual but not Evangelical; support from friends; feeling connected to [facility] [employer]     TRAUMA HISTORY- neglect as a child  FEELS SAFE AT HOME- Yes  FAMILY MENTAL HEALTH HISTORY-  nothing diagnosed with parents, brother    MEDICAL / SURGICAL HISTORY         A comprehensive review of systems was performed and is negative other than noted in the HPI.    Pregnant or breastfeeding- no      Contraception- no    Denies TBI/LOC; she used to hit herself in her head as a child. Denies seizures. NKDA    Patient Active Problem List   Diagnosis    Constipation, unspecified constipation type    Rectal ulcer    Hemorrhoid    Moderate episode of recurrent major depressive disorder (H)    JAMA (generalized anxiety disorder)    Abdominal pain, generalized    Epigastric pain    Nausea    Bloating      ALLERGY        Patient has no known allergies.    MEDICATIONS          No current outpatient medications on file.     VITALS         Ht 1.6 m (5' 3\")   Wt 46.3 kg (102 lb)   BMI 18.07 kg/m       Pulse Readings from Last 5 Encounters:   06/24/24 61   06/03/24 60   11/14/23 59   11/15/22 60   11/03/22 59     Wt Readings from Last 5 Encounters:   02/03/25 46.3 kg (102 lb)   06/24/24 45.4 kg (100 lb)   06/03/24 45.5 kg (100 lb 3.2 oz)   11/14/23 45.6 kg (100 lb 9.6 oz)   11/15/22 45.5 kg (100 lb 3.2 oz)     BP Readings from Last 5 Encounters:   06/24/24 116/76   06/03/24 116/72   11/14/23 122/84   11/15/22 123/76   11/03/22 114/80     MENTAL STATUS EXAM          Alertness: alert  and oriented  Appearance: adequately groomed  Behavior/Demeanor: cooperative, pleasant, and calm, with good  eye contact   Speech: normal and regular rate and rhythm  Language: no problems  Psychomotor: normal or unremarkable  Mood: depressed and anxious  Affect: restricted and appropriate; was congruent to mood; was congruent to content  Thought " Process/Associations: overinclusive   Thought Content:  Reports suicidal ideation without plan; without intent [details in Interim History];  Denies suicidal and violent ideation, delusions, preoccupations, obsessions , phobia , magical thinking, over-valued ideas, and paranoid ideation  Perception:  Reports none;  Denies auditory hallucinations, visual hallucinations, visual distortion seen as shadows , depersonalization, and derealization  Insight: fair  Judgment: adequate for safety  Cognition: does not appear grossly intact; formal cognitive testing was not done  Gait and Station:  N/A    LABS and DATA     Answers submitted by the patient for this visit:  Patient Health Questionnaire (Submitted on 2/3/2025)  If you checked off any problems, how difficult have these problems made it for you to do your work, take care of things at home, or get along with other people?: Extremely difficult  PHQ9 TOTAL SCORE: 26  Patient Health Questionnaire (G7) (Submitted on 2/3/2025)  JAMA 7 TOTAL SCORE: 19    PHQ9 TODAY =       6/3/2024     2:42 PM 7/8/2024    10:00 AM 2/3/2025     4:24 PM   PHQ   PHQ-9 Total Score 18 20 26    Q9: Thoughts of better off dead/self-harm past 2 weeks Several days Several days Nearly every day    F/U: Thoughts of suicide or self-harm Yes Yes Yes    F/U: Self harm-plan Yes No No    F/U: Self-harm action No No No    F/U: Safety concerns No No Yes        Proxy-reported     Recent Labs   Lab Test 06/17/22  1203   CR 0.67   GFRESTIMATED >90     Recent Labs   Lab Test 06/17/22  1203   AST 20   ALT 16   ALKPHOS 82       ASSESSMENT     Today, the following items were reviewed:     : 07/2024    PSYCHOTROPIC DRUG INTERACTIONS: N/A.  Drug Interaction Management: N/A    DIAGNOSES                                           Impression includes JAMA, moderate, recurrent MDD  - she is seeking diagnostic clarity around symptoms of anxiety spectrum (JAMA,  OCD), healthy eating, BPD vs normal developmental adjustment  to adulthood, intermittent inattention     PLAN            1) discussed options, risks, benefits, utility of antidepressants and MOA/ ASE including GI, luteal phase mood, her preference for holistic health approaches, paradoxical risk should she decide to pursue med options, that we will continue to meet and assess her most accurate diagnoses over time; today, she considers sertraline 12.5mg to 25mg daily, will investigate credible online sources and message writer if she chooses to proceed.     2) active in therapy     RTC: as needed    Level of Medical Decision Making:   - At least 1 chronic problem that is not stable  - Engaged in prescription drug management during visit (discussed any medication benefits, side effects, alternatives, etc.)     The longitudinal plan of care for depression, anxiety spectrum was addressed during this visit. Due to the added complexity in care, I will continue to support Tahmina in the subsequent management of this condition(s) and with the ongoing continuity of care of this condition(s).    Psychiatry Individual Psychotherapy Note   Psychotherapy start time - 11:30a  Psychotherapy end time - 11:46a  Date treatment plan last reviewed with patient - 07/08/24  Subjective: This supportive psychotherapy session addressed issues related to goals of therapy and current psychosocial stressors. Patient's reaction: Contemplation in the context of mental status appropriate for ambulatory setting.    Interactive complexity indicated? No  -The need to manage maladaptive communication (related to, e.g., high anxiety, high reactivity, repeated questions, or disagreement) among participants that complicates delivery of care  Plan: RTC in timeframe noted above  Psychotherapy services during this visit included myself and the patient.   Treatment Plan      SYMPTOMS; PROBLEMS   MEASURABLE GOALS;    FUNCTIONAL IMPROVEMENT / GAINS INTERVENTIONS DISCHARGE CRITERIA   Depression: anhedonia and appetite  changes  Anxiety: excessive worry and nervous/overwhelmed   find enjoyment at least once a day, make a plan to manage 2-3 anxiety-provoking situations, be free of threats to self, and as structure and exercise appear helpful for mood, work on sleep hygiene, exercise habit  Supportive / psychodynamic marked symptom improvement       CRISIS NUMBERS:   Provided routinely in AVS     TREATMENT RISK STATEMENT:  The risks, benefits, alternatives and potential adverse effects have been discussed and are understood by the pt. The pt understands the risks of using street drugs or alcohol. There are no medical contraindications, the pt agrees to treatment with the ability to do so. The pt knows to call the clinic for any problems or to access emergency care if needed.  Medical and substance use concerns are documented above.  Psychotropic drug interaction check was done, including changes made today.    PROVIDER: YOLIS Mcnair CNP

## 2025-02-03 NOTE — NURSING NOTE
Current patient location: [address] [place] [address]    Is the patient currently in the state of [place]? YES    Visit mode: VIDEO    If the visit is dropped, the patient can be reconnected by:VIDEO VISIT: Send to e-mail at: [email or web address]    Will anyone else be joining the visit? NO  (If patient encounters technical issues they should call [phone]    Are changes needed to the allergy or medication list? No    Are refills needed on medications prescribed by this physician? NO    Rooming Documentation:  Questionnaire(s) completed    Reason for visit: RECHKINZA RIVEROF

## 2025-02-03 NOTE — PATIENT INSTRUCTIONS
**For crisis resources, please see the information at the end of this document**   Patient Education    Thank you for coming to the [facility] [place] [facility].     Lab Testing:  If you had lab testing today and your results are reassuring or normal they will be mailed to you or sent through "[employer] within 7 days. If the lab tests need quick action we will call you with the results. The phone number we will call with results is # [phone]. If this is not the best number please call our clinic and change the number.     Medication Refills:  If you need any refills please call your pharmacy and they will contact us. Our fax number for refills is [phone].   Three business days of notice are needed for general medication refill requests.   Five business days of notice are needed for controlled substance refill requests.   If you need to change to a different pharmacy, please contact the new pharmacy directly. The new pharmacy will help you get your medications transferred.     Contact Us:  Please call [phone] during business hours (8-5:00 M-F).   If you have medication related questions after clinic hours, or on the weekend, please call [phone].     Financial Assistance [phone]   Medical Records [phone]       MENTAL HEALTH CRISIS RESOURCES:  For a emergency help, please call 911 or go to the nearest Emergency Department.     Emergency Walk-In Options:   EmPATH Unit @ [facility] ([place]): [phone] - Specialized mental health emergency area designed to be calming  [facility] ([place]): [phone]  [facility] ([place]): [phone]  [facility]): [phone]    [facility] Crisis Information:   [place]: [phone]  [place]: [phone]  [place] ([place]) - Adult: [phone]     Child: [phone]  [place] - Adult: [phone]     Child: [phone]  [place]:  [phone]  List of all [facility] resources:   [place]://[place].gov/dhs/people-we-serve/adults/health-care/mental-health/resources/crisis-contacts.jsp    National Crisis Information:   Crisis Text Line: Text  [place]  to 113534  Suicide & Crisis Lifeline: 988  National Suicide Prevention Lifeline: 8-387-471-TALK (1-853.591.3053)       For online chat options, visit https://suicidepreventionlifeline.org/chat/  Poison Control Center: 2-067-952-8905  Trans Lifeline: 4-032-338-3398 - Hotline for transgender people of all ages  The Andriy Project: 7-337-706-5882 - Hotline for LGBT youth     For Non-Emergency Support:   Fast Tracker: Mental Health & Substance Use Disorder Resources -   https://www.[employer]astern.org/

## 2025-02-06 ENCOUNTER — LAB REQUISITION (OUTPATIENT)
Dept: LAB | Facility: CLINIC | Age: 25
End: 2025-02-06
Payer: COMMERCIAL

## 2025-02-06 DIAGNOSIS — Z11.3 ENCOUNTER FOR SCREENING FOR INFECTIONS WITH A PREDOMINANTLY SEXUAL MODE OF TRANSMISSION: ICD-10-CM

## 2025-02-06 LAB — HIV 1+2 AB+HIV1 P24 AG SERPL QL IA: NONREACTIVE

## 2025-02-06 PROCEDURE — 87340 HEPATITIS B SURFACE AG IA: CPT | Performed by: ADVANCED PRACTICE MIDWIFE

## 2025-02-06 PROCEDURE — 87389 HIV-1 AG W/HIV-1&-2 AB AG IA: CPT | Performed by: ADVANCED PRACTICE MIDWIFE

## 2025-02-06 PROCEDURE — 86780 TREPONEMA PALLIDUM: CPT | Performed by: ADVANCED PRACTICE MIDWIFE

## 2025-02-06 PROCEDURE — 87491 CHLMYD TRACH DNA AMP PROBE: CPT | Performed by: ADVANCED PRACTICE MIDWIFE

## 2025-02-06 PROCEDURE — 87529 HSV DNA AMP PROBE: CPT | Mod: ORL | Performed by: ADVANCED PRACTICE MIDWIFE

## 2025-02-06 PROCEDURE — 86803 HEPATITIS C AB TEST: CPT | Performed by: ADVANCED PRACTICE MIDWIFE

## 2025-02-07 LAB
C TRACH DNA SPEC QL PROBE+SIG AMP: NEGATIVE
HBV SURFACE AG SERPL QL IA: NONREACTIVE
HCV AB SERPL QL IA: NONREACTIVE
HSV1 DNA SPEC QL NAA+PROBE: DETECTED
HSV2 DNA SPEC QL NAA+PROBE: NOT DETECTED
N GONORRHOEA DNA SPEC QL NAA+PROBE: NEGATIVE
SPECIMEN TYPE: ABNORMAL
SPECIMEN TYPE: NORMAL
T PALLIDUM AB SER QL: NONREACTIVE

## 2025-07-08 ENCOUNTER — VIRTUAL VISIT (OUTPATIENT)
Dept: PSYCHIATRY | Facility: CLINIC | Age: 25
End: 2025-07-08
Attending: NURSE PRACTITIONER
Payer: COMMERCIAL

## 2025-07-08 DIAGNOSIS — F33.1 MODERATE EPISODE OF RECURRENT MAJOR DEPRESSIVE DISORDER (H): ICD-10-CM

## 2025-07-08 DIAGNOSIS — F41.1 GAD (GENERALIZED ANXIETY DISORDER): ICD-10-CM

## 2025-07-08 PROCEDURE — 98006 SYNCH AUDIO-VIDEO EST MOD 30: CPT | Performed by: NURSE PRACTITIONER

## 2025-07-08 PROCEDURE — 1126F AMNT PAIN NOTED NONE PRSNT: CPT | Mod: 95 | Performed by: NURSE PRACTITIONER

## 2025-07-08 PROCEDURE — G2211 COMPLEX E/M VISIT ADD ON: HCPCS | Mod: 95 | Performed by: NURSE PRACTITIONER

## 2025-07-08 ASSESSMENT — PATIENT HEALTH QUESTIONNAIRE - PHQ9
SUM OF ALL RESPONSES TO PHQ QUESTIONS 1-9: 13
10. IF YOU CHECKED OFF ANY PROBLEMS, HOW DIFFICULT HAVE THESE PROBLEMS MADE IT FOR YOU TO DO YOUR WORK, TAKE CARE OF THINGS AT HOME, OR GET ALONG WITH OTHER PEOPLE: VERY DIFFICULT
SUM OF ALL RESPONSES TO PHQ QUESTIONS 1-9: 13

## 2025-07-08 ASSESSMENT — ANXIETY QUESTIONNAIRES
5. BEING SO RESTLESS THAT IT IS HARD TO SIT STILL: MORE THAN HALF THE DAYS
6. BECOMING EASILY ANNOYED OR IRRITABLE: NEARLY EVERY DAY
7. FEELING AFRAID AS IF SOMETHING AWFUL MIGHT HAPPEN: NOT AT ALL
GAD7 TOTAL SCORE: 15
7. FEELING AFRAID AS IF SOMETHING AWFUL MIGHT HAPPEN: NOT AT ALL
1. FEELING NERVOUS, ANXIOUS, OR ON EDGE: MORE THAN HALF THE DAYS
2. NOT BEING ABLE TO STOP OR CONTROL WORRYING: NEARLY EVERY DAY
GAD7 TOTAL SCORE: 15
IF YOU CHECKED OFF ANY PROBLEMS ON THIS QUESTIONNAIRE, HOW DIFFICULT HAVE THESE PROBLEMS MADE IT FOR YOU TO DO YOUR WORK, TAKE CARE OF THINGS AT HOME, OR GET ALONG WITH OTHER PEOPLE: VERY DIFFICULT
GAD7 TOTAL SCORE: 15
4. TROUBLE RELAXING: NEARLY EVERY DAY
3. WORRYING TOO MUCH ABOUT DIFFERENT THINGS: MORE THAN HALF THE DAYS
8. IF YOU CHECKED OFF ANY PROBLEMS, HOW DIFFICULT HAVE THESE MADE IT FOR YOU TO DO YOUR WORK, TAKE CARE OF THINGS AT HOME, OR GET ALONG WITH OTHER PEOPLE?: VERY DIFFICULT

## 2025-07-08 ASSESSMENT — PAIN SCALES - GENERAL: PAINLEVEL_OUTOF10: NO PAIN (0)
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
## 2025-07-08 NOTE — PATIENT INSTRUCTIONS
**For crisis resources, please see the information at the end of this document**   Patient Education    Thank you for coming to the [facility] [place] [facility].     Lab Testing:  If you had lab testing today and your results are reassuring or normal they will be mailed to you or sent through [employer] within 7 days. If the lab tests need quick action we will call you with the results. The phone number we will call with results is # [phone]. If this is not the best number please call our clinic and change the number.     Medication Refills:  If you need any refills please call your pharmacy and they will contact us. Our fax number for refills is [phone].   Three business days of notice are needed for general medication refill requests.   Five business days of notice are needed for controlled substance refill requests.   If you need to change to a different pharmacy, please contact the new pharmacy directly. The new pharmacy will help you get your medications transferred.     Contact Us:  Please call [phone] during business hours (8-5:00 M-F).   If you have medication related questions after clinic hours, or on the weekend, please call [phone].     Financial Assistance [phone]   Medical Records [phone]       MENTAL HEALTH CRISIS RESOURCES:  For a emergency help, please call 911 or go to the nearest Emergency Department.     Emergency Walk-In Options:   EmPATH Unit @ [facility] ([place]): [phone] - Specialized mental health emergency area designed to be calming  [facility] ([place]): [phone]  [facility] ([place]): [phone]  [facility]): [phone]    [facility] Crisis Information:   [place]: [phone]  [place]: [phone]  [place] ([place]) - Adult: [phone]     Child: [phone]  [place] - Adult: [phone]     Child: [phone]  [place]:  [phone]  List of all [facility] resources:   https://[place].gov/dhs/people-we-serve/adults/health-care/mental-health/resources/crisis-contacts.jsp    National Crisis Information:   Crisis Text Line: Text  [place]  to 374669  Suicide & Crisis Lifeline: 988  National Suicide Prevention Lifeline: 2-350-883-TALK (1-540.282.5673)       For online chat options, visit https://suicidepreventionlifeline.org/chat/  Poison Control Center: 4-958-710-3514  Trans Lifeline: 0-685-663-9830 - Hotline for transgender people of all ages  The Andriy Project: 2-090-583-2319 - Hotline for LGBT youth     For Non-Emergency Support:   Fast Tracker: Mental Health & Substance Use Disorder Resources -   https://www.[employer].org/

## 2025-07-08 NOTE — NURSING NOTE
Current patient location: [address] [place] [address]    Is the patient currently in the state of [place]? YES    Visit mode: VIDEO    If the visit is dropped, the patient can be reconnected by:VIDEO VISIT: Text to cell phone:   Telephone Information:   Mobile [phone]       Will anyone else be joining the visit? NO  (If patient encounters technical issues they should call [phone]    Are changes needed to the allergy or medication list? No    Are refills needed on medications prescribed by this physician? YES    Rooming Documentation:  Patient will complete questionnaire(s) in [employer]    Reason for visit: RECHECK    Ap RIVEROF

## 2025-08-12 ENCOUNTER — VIRTUAL VISIT (OUTPATIENT)
Dept: PSYCHIATRY | Facility: CLINIC | Age: 25
End: 2025-08-12
Attending: NURSE PRACTITIONER
Payer: COMMERCIAL

## 2025-08-12 DIAGNOSIS — F41.1 GAD (GENERALIZED ANXIETY DISORDER): ICD-10-CM

## 2025-08-12 DIAGNOSIS — F33.1 MODERATE EPISODE OF RECURRENT MAJOR DEPRESSIVE DISORDER (H): ICD-10-CM

## 2025-08-12 PROCEDURE — 1125F AMNT PAIN NOTED PAIN PRSNT: CPT | Mod: 93 | Performed by: NURSE PRACTITIONER

## 2025-08-12 PROCEDURE — 98014 SYNCH AUDIO-ONLY EST MOD 30: CPT | Performed by: NURSE PRACTITIONER

## 2025-08-12 PROCEDURE — G2211 COMPLEX E/M VISIT ADD ON: HCPCS | Mod: 93 | Performed by: NURSE PRACTITIONER

## 2025-08-12 RX ORDER — PENICILLIN V POTASSIUM 500 MG/1
TABLET, FILM COATED ORAL
COMMUNITY
Start: 2025-08-11

## 2025-08-12 RX ORDER — SERTRALINE HYDROCHLORIDE 25 MG/1
25 TABLET, FILM COATED ORAL DAILY
Qty: 30 TABLET | Refills: 1 | Status: SHIPPED | OUTPATIENT
Start: 2025-08-12

## 2025-08-12 ASSESSMENT — PATIENT HEALTH QUESTIONNAIRE - PHQ9
SUM OF ALL RESPONSES TO PHQ QUESTIONS 1-9: 18
10. IF YOU CHECKED OFF ANY PROBLEMS, HOW DIFFICULT HAVE THESE PROBLEMS MADE IT FOR YOU TO DO YOUR WORK, TAKE CARE OF THINGS AT HOME, OR GET ALONG WITH OTHER PEOPLE: VERY DIFFICULT
SUM OF ALL RESPONSES TO PHQ QUESTIONS 1-9: 18

## 2025-08-12 ASSESSMENT — ANXIETY QUESTIONNAIRES
GAD7 TOTAL SCORE: 11
1. FEELING NERVOUS, ANXIOUS, OR ON EDGE: MORE THAN HALF THE DAYS
4. TROUBLE RELAXING: NEARLY EVERY DAY
7. FEELING AFRAID AS IF SOMETHING AWFUL MIGHT HAPPEN: SEVERAL DAYS
5. BEING SO RESTLESS THAT IT IS HARD TO SIT STILL: SEVERAL DAYS
8. IF YOU CHECKED OFF ANY PROBLEMS, HOW DIFFICULT HAVE THESE MADE IT FOR YOU TO DO YOUR WORK, TAKE CARE OF THINGS AT HOME, OR GET ALONG WITH OTHER PEOPLE?: VERY DIFFICULT
IF YOU CHECKED OFF ANY PROBLEMS ON THIS QUESTIONNAIRE, HOW DIFFICULT HAVE THESE PROBLEMS MADE IT FOR YOU TO DO YOUR WORK, TAKE CARE OF THINGS AT HOME, OR GET ALONG WITH OTHER PEOPLE: VERY DIFFICULT
7. FEELING AFRAID AS IF SOMETHING AWFUL MIGHT HAPPEN: SEVERAL DAYS
6. BECOMING EASILY ANNOYED OR IRRITABLE: SEVERAL DAYS
2. NOT BEING ABLE TO STOP OR CONTROL WORRYING: MORE THAN HALF THE DAYS
GAD7 TOTAL SCORE: 11
3. WORRYING TOO MUCH ABOUT DIFFERENT THINGS: SEVERAL DAYS
GAD7 TOTAL SCORE: 11